# Patient Record
Sex: MALE | Race: BLACK OR AFRICAN AMERICAN | Employment: FULL TIME | URBAN - METROPOLITAN AREA
[De-identification: names, ages, dates, MRNs, and addresses within clinical notes are randomized per-mention and may not be internally consistent; named-entity substitution may affect disease eponyms.]

---

## 2017-05-24 ENCOUNTER — OFFICE VISIT (OUTPATIENT)
Dept: FAMILY MEDICINE CLINIC | Age: 47
End: 2017-05-24

## 2017-05-24 VITALS
HEIGHT: 75 IN | TEMPERATURE: 97.3 F | WEIGHT: 214 LBS | OXYGEN SATURATION: 98 % | DIASTOLIC BLOOD PRESSURE: 110 MMHG | RESPIRATION RATE: 20 BRPM | SYSTOLIC BLOOD PRESSURE: 175 MMHG | BODY MASS INDEX: 26.61 KG/M2 | HEART RATE: 87 BPM

## 2017-05-24 DIAGNOSIS — Z12.5 SCREENING FOR PROSTATE CANCER: ICD-10-CM

## 2017-05-24 DIAGNOSIS — Z23 ENCOUNTER FOR IMMUNIZATION: ICD-10-CM

## 2017-05-24 DIAGNOSIS — E83.52 HYPERCALCEMIA: ICD-10-CM

## 2017-05-24 DIAGNOSIS — E78.00 HYPERCHOLESTEROLEMIA: ICD-10-CM

## 2017-05-24 DIAGNOSIS — I10 ESSENTIAL HYPERTENSION: ICD-10-CM

## 2017-05-24 DIAGNOSIS — E11.8 TYPE 2 DIABETES MELLITUS WITH COMPLICATION, WITHOUT LONG-TERM CURRENT USE OF INSULIN (HCC): Primary | ICD-10-CM

## 2017-05-24 LAB
BILIRUB UR QL STRIP: NEGATIVE
GLUCOSE POC: 332 MG/DL
GLUCOSE UR-MCNC: NORMAL MG/DL
HBA1C MFR BLD HPLC: >15 %
KETONES P FAST UR STRIP-MCNC: NEGATIVE MG/DL
PH UR STRIP: 6 [PH] (ref 4.6–8)
PROT UR QL STRIP: NEGATIVE MG/DL
SP GR UR STRIP: 1.01 (ref 1–1.03)
UA UROBILINOGEN AMB POC: NORMAL (ref 0.2–1)
URINALYSIS CLARITY POC: CLEAR
URINALYSIS COLOR POC: YELLOW
URINE BLOOD POC: NEGATIVE
URINE LEUKOCYTES POC: NEGATIVE
URINE NITRITES POC: NEGATIVE

## 2017-05-24 RX ORDER — GLIPIZIDE 10 MG/1
TABLET ORAL
Qty: 60 TAB | Refills: 5 | Status: SHIPPED | OUTPATIENT
Start: 2017-05-24 | End: 2019-03-13 | Stop reason: SDUPTHER

## 2017-05-24 RX ORDER — PRAVASTATIN SODIUM 40 MG/1
TABLET ORAL
Qty: 30 TAB | Refills: 5 | Status: SHIPPED | OUTPATIENT
Start: 2017-05-24 | End: 2019-03-13 | Stop reason: SDUPTHER

## 2017-05-24 RX ORDER — INSULIN PUMP SYRINGE, 3 ML
EACH MISCELLANEOUS
Qty: 1 KIT | Refills: 0 | Status: SHIPPED | OUTPATIENT
Start: 2017-05-24 | End: 2019-04-16 | Stop reason: SDUPTHER

## 2017-05-24 RX ORDER — LANCETS
EACH MISCELLANEOUS
Qty: 100 EACH | Refills: 11 | Status: SHIPPED | OUTPATIENT
Start: 2017-05-24 | End: 2019-04-16 | Stop reason: SDUPTHER

## 2017-05-24 RX ORDER — HYDROCHLOROTHIAZIDE 25 MG/1
TABLET ORAL
Qty: 30 TAB | Refills: 0 | Status: CANCELLED | OUTPATIENT
Start: 2017-05-24

## 2017-05-24 RX ORDER — GABAPENTIN 300 MG/1
CAPSULE ORAL
Qty: 90 CAP | Refills: 5 | Status: SHIPPED | OUTPATIENT
Start: 2017-05-24 | End: 2019-03-13 | Stop reason: ALTCHOICE

## 2017-05-24 RX ORDER — METFORMIN HYDROCHLORIDE 500 MG/1
2000 TABLET, EXTENDED RELEASE ORAL
Qty: 120 TAB | Refills: 3 | Status: SHIPPED | OUTPATIENT
Start: 2017-05-24 | End: 2018-02-08 | Stop reason: SDUPTHER

## 2017-05-24 RX ORDER — LISINOPRIL 20 MG/1
TABLET ORAL
Qty: 30 TAB | Refills: 5 | Status: SHIPPED | OUTPATIENT
Start: 2017-05-24 | End: 2019-03-13 | Stop reason: SDUPTHER

## 2017-05-24 NOTE — PATIENT INSTRUCTIONS
Vaccine Information Statement     Tdap (Tetanus, Diphtheria, Pertussis) Vaccine: What You Need to Know    Many Vaccine Information Statements are available in Faroese and other languages. See www.immunize.org/vis. Hojas de Información Sobre Vacunas están disponibles en español y en muchos otros idiomas. Visite [email or web address]    1. Why get vaccinated? Tetanus, diphtheria, and pertussis are very serious diseases. Tdap vaccine can protect us from these diseases. And, Tdap vaccine given to pregnant women can protect  babies against pertussis. TETANUS (Lockjaw) is rare in the [facility] today. It causes painful muscle tightening and stiffness, usually all over the body.  It can lead to tightening of muscles in the head and neck so you cant open your mouth, swallow, or sometimes even breathe. Tetanus kills about 1 out of 10 people who are infected even after receiving the best medical care. DIPHTHERIA is also rare in the [facility] today. It can cause a thick coating to form in the back of the throat.  It can lead to breathing problems, heart failure, paralysis, and death. PERTUSSIS (Whooping Cough) causes severe coughing spells, which can cause difficulty breathing, vomiting, and disturbed sleep.  It can also lead to weight loss, incontinence, and rib fractures. Up to 2 in 100 adolescents and 5 in 100 adults with pertussis are hospitalized or have complications, which could include pneumonia or death. These diseases are caused by bacteria. Diphtheria and pertussis are spread from person to person through secretions from coughing or sneezing. Tetanus enters the body through cuts, scratches, or wounds. Before vaccines, as many as 200,000 cases of diphtheria, 200,000 cases of pertussis, and hundreds of cases of tetanus, were reported in the [place] each year.  Since vaccination began, reports of cases for tetanus and diphtheria have dropped by about 99% and for pertussis by about 80%. 2. Tdap vaccine    Tdap vaccine can protect adolescents and adults from tetanus, diphtheria, and pertussis. One dose of Tdap is routinely given at age 6 or 15. People who did not get Tdap at that age should get it as soon as possible. Tdap is especially important for health care professionals and anyone having close contact with a baby younger than 12 months. Pregnant women should get a dose of Tdap during every pregnancy, to protect the  from pertussis. Infants are most at risk for severe, life-threatening complications from pertussis. Another vaccine, called Td, protects against tetanus and diphtheria, but not pertussis. A Td booster should be given every 10 years. Tdap may be given as one of these boosters if you have never gotten Tdap before. Tdap may also be given after a severe cut or burn to prevent tetanus infection. Your doctor or the person giving you the vaccine can give you more information. Tdap may safely be given at the same time as other vaccines. 3. Some people should not get this vaccine     A person who has ever had a life-threatening allergic reaction after a previous dose of any diphtheria, tetanus or pertussis containing vaccine, OR has a severe allergy to any part of this vaccine, should not get Tdap vaccine. Tell the person giving the vaccine about any severe allergies.  Anyone who had coma or long repeated seizures within 7 days after a childhood dose of DTP or DTaP, or a previous dose of Tdap, should not get Tdap, unless a cause other than the vaccine was found. They can still get Td.  Talk to your doctor if you:  - have seizures or another nervous system problem,  - had severe pain or swelling after any vaccine containing diphtheria, tetanus or pertussis,   - ever had a condition called Guillain Barré Syndrome (GBS),  - arent feeling well on the day the shot is scheduled.     4. Risks    With any medicine, including vaccines, there is a chance of side effects. These are usually mild and go away on their own. Serious reactions are also possible but are rare. Most people who get Tdap vaccine do not have any problems with it. Mild Problems following Tdap  (Did not interfere with activities)   Pain where the shot was given (about 3 in 4 adolescents or 2 in 3 adults)   Redness or swelling where the shot was given (about 1 person in 5)   Mild fever of at least 100.4°F (up to about 1 in 25 adolescents or 1 in 100 adults)   Headache (about 3 or 4 people in 10)   Tiredness (about 1 person in 3 or 4)   Nausea, vomiting, diarrhea, stomach ache (up to 1 in 4 adolescents or 1 in 10 adults)   Chills,  sore joints (about 1 person in 10)   Body aches (about 1 person in 3 or 4)    Rash, swollen glands (uncommon)    Moderate Problems following Tdap  (Interfered with activities, but did not require medical attention)   Pain where the shot was given (up to 1 in 5 or 6)    Redness or swelling where the shot was given (up to about 1 in 16 adolescents or 1 in 12 adults)   Fever over 102°F (about 1 in 100 adolescents or 1 in 250 adults)   Headache (about 1 in 7 adolescents or 1 in 10 adults)   Nausea, vomiting, diarrhea, stomach ache (up to 1 or 3 people in 100)   Swelling of the entire arm where the shot was given (up to about 1 in 500). Severe Problems following Tdap  (Unable to perform usual activities; required medical attention)   Swelling, severe pain, bleeding, and redness in the arm where the shot was given (rare). Problems that could happen after any vaccine:     People sometimes faint after a medical procedure, including vaccination. Sitting or lying down for about 15 minutes can help prevent fainting, and injuries caused by a fall. Tell your doctor if you feel dizzy, or have vision changes or ringing in the ears.      Some people get severe pain in the shoulder and have difficulty moving the arm where a shot was given. This happens very rarely.  Any medication can cause a severe allergic reaction. Such reactions from a vaccine are very rare, estimated at fewer than 1 in a million doses, and would happen within a few minutes to a few hours after the vaccination. As with any medicine, there is a very remote chance of a vaccine causing a serious injury or death. The safety of vaccines is always being monitored. For more information, visit: www.cdc.gov/vaccinesafety/    5. What if there is a serious problem? What should I look for?  Look for anything that concerns you, such as signs of a severe allergic reaction, very high fever, or unusual behavior.  Signs of a severe allergic reaction can include hives, swelling of the face and throat, difficulty breathing, a fast heartbeat, dizziness, and weakness. These would usually start a few minutes to a few hours after the vaccination. What should I do?  If you think it is a severe allergic reaction or other emergency that cant wait, call 9-1-1 or get the person to the nearest hospital. Otherwise, call your doctor.  Afterward, the reaction should be reported to the Vaccine Adverse Event Reporting System (VAERS). Your doctor might file this report, or you can do it yourself through the VAERS web site at www.vaers. [facility].gov, or by calling 8-788.249.8279. VAERS does not give medical advice. 6. The National Vaccine Injury Compensation Program    The [facility] Vaccine Injury Compensation Program (VICP) is a federal program that was created to compensate people who may have been injured by certain vaccines. Persons who believe they may have been injured by a vaccine can learn about the program and about filing a claim by calling 9-320.127.2409 or visiting the [facility] website at www.[facility].gov/vaccinecompensation. There is a time limit to file a claim for compensation. 7. How can I learn more?  Ask your doctor.  He or she can give you the vaccine package insert or suggest other sources of information.  Call your local or state health department.  Contact the Centers for Disease Control and Prevention (CDC):  - Call 3-115.449.9740 (1-800-CDC-INFO) or  - Visit CDCs website at www.cdc.gov/vaccines      Vaccine Information Statement   Tdap Vaccine  (2/24/2015)  [address] [id number]    Department of Health and Human Services  Centers for Disease Control and Prevention    Office Use Only

## 2017-05-24 NOTE — PROGRESS NOTES
HISTORY OF PRESENT ILLNESS  Ramesh Fu is a 48 y.o. male. HPI  Patient comes in today to establish care. Hx of DM for 8-9 years. States not compliant with medications and diet. Admits lack ofhealth coverage has prevented him at times from being able to take medications. States he has not monitored his blood sugars in a while. Complains of nocturia every 1-2 hours. Polyuria. Numbness, tingling and pain in feet. Taking neurotin. Weighed 265lb in past.  Last time weighed was ~3 months ago and was 225-230lbs, now 10-15 lbs lighter. Changed from drinking regular sodas to diet sodas. Does eat sweets, not daily, but likes honey buns and Corrine cakes. Hx HTN, noncompliant with a medication regimen and diet. Patient is a current smoker, ~1 ppd. No Known Allergies    Past Medical History:   Diagnosis Date    DM (diabetes mellitus) ([facility] [address] 6/1/2010    Hypertension        Past Surgical History:   Procedure Laterality Date    HX ORTHOPAEDIC      right arm surgery as a child       Social History     Social History    Marital status: SINGLE     Spouse name: N/A    Number of children: N/A    Years of education: N/A     Occupational History    Not on file.      Social History Main Topics    Smoking status: Current Every Day Smoker     Packs/day: 1.00    Smokeless tobacco: Not on file    Alcohol use No    Drug use: No    Sexual activity: Not on file     Other Topics Concern    Not on file     Social History Narrative       Family History   Problem Relation Age of Onset    Diabetes Mother     Hypertension Mother     Cancer Father     Diabetes Sister     Hypertension Sister        Current Outpatient Prescriptions   Medication Sig    pravastatin (PRAVACHOL) 40 mg tablet TAKE 1 TABLET BY MOUTH NIGHTLY    gabapentin (NEURONTIN) 300 mg capsule TAKE ONE CAPSULE BY MOUTH THREE TIMES DAILY    lisinopril (PRINIVIL, ZESTRIL) 20 mg tablet TAKE ONE TABLET BY MOUTH ONCE DAILY    TRADJENTA 5 mg tablet TAKE ONE TABLET BY MOUTH ONCE DAILY    hydroCHLOROthiazide (HYDRODIURIL) 25 mg tablet TAKE ONE TABLET BY MOUTH ONCE DAILY    glipiZIDE (GLUCOTROL) 10 mg tablet TAKE ONE TABLET BY MOUTH ONCE DAILY    metFORMIN ER (GLUCOPHAGE XR) 500 mg tablet Take 4 Tabs by mouth daily (with dinner).  clotrimazole (LOTRIMIN) 1 % topical cream Apply  to affected area two (2) times a day. No current facility-administered medications for this visit. Review of Systems   Constitutional: Negative for chills, diaphoresis, fever, malaise/fatigue and weight loss. HENT: Negative for congestion, ear pain, sore throat and tinnitus. Eyes: Negative for blurred vision and double vision. Respiratory: Negative for cough, sputum production, shortness of breath and wheezing. Cardiovascular: Negative for chest pain, palpitations and leg swelling. Gastrointestinal: Negative for abdominal pain, blood in stool, constipation, diarrhea, nausea and vomiting. Genitourinary: Positive for frequency. Negative for dysuria, flank pain, hematuria and urgency. Decreased libido   Musculoskeletal: Negative for back pain, joint pain and myalgias. Skin: Negative. Neurological: Negative for dizziness, tingling, sensory change, speech change, focal weakness and headaches. Endo/Heme/Allergies: Positive for polydipsia. Psychiatric/Behavioral: Negative for depression. The patient is not nervous/anxious and does not have insomnia. Vitals:    05/24/17 1419   BP: (!) 175/110   Pulse: 87   Resp: 20   Temp: 97.3 °F (36.3 °C)   TempSrc: Oral   SpO2: 98%   Weight: 214 lb (97.1 kg)   Height: 6' 3\" (1.905 m)     Physical Exam   Constitutional: He is oriented to person, place, and time. He appears well-developed and well-nourished. He is cooperative.    BP elevated   HENT:   Right Ear: Hearing, tympanic membrane, external ear and ear canal normal.   Left Ear: Hearing, tympanic membrane, external ear and ear canal normal.   Nose: Nose normal. Mouth/Throat: Uvula is midline, oropharynx is clear and moist and mucous membranes are normal.   Neck: Carotid bruit is not present. No thyroid mass and no thyromegaly present. Cardiovascular: Normal rate, regular rhythm and normal heart sounds. Pulmonary/Chest: Effort normal and breath sounds normal.   Abdominal: Soft. Normal appearance and bowel sounds are normal. There is no hepatosplenomegaly. There is no tenderness. There is no CVA tenderness. Lymphadenopathy:        Head (right side): No submental, no submandibular and no tonsillar adenopathy present. Head (left side): No submental, no submandibular and no tonsillar adenopathy present. He has no cervical adenopathy. Neurological: He is alert and oriented to person, place, and time. Skin: Skin is warm, dry and intact. Psychiatric: He has a normal mood and affect. His behavior is normal. Judgment and thought content normal.     ASSESSMENT and PLAN    ICD-10-CM ICD-9-CM    1. Type 2 diabetes mellitus with complication, without long-term current use of insulin ([facility]) E11.8 250.90  DIABETES FOOT EXAM      gabapentin (NEURONTIN) 300 mg capsule      linagliptin (TRADJENTA) 5 mg tablet      glipiZIDE (GLUCOTROL) 10 mg tablet      metFORMIN ER (GLUCOPHAGE XR) 500 mg tablet      AMB POC HEMOGLOBIN A1C      AMB POC GLUCOSE, QUANTITATIVE, BLOOD      METABOLIC PANEL, COMPREHENSIVE      TSH RFX ON ABNORMAL TO FREE T4      LIPID PANEL      CBC WITH AUTOMATED DIFF      VITAMIN D, 25 HYDROXY      AMB POC URINALYSIS DIP STICK AUTO W/ MICRO      MICROALBUMIN, UR, RAND W/ MICROALBUMIN/CREA RATIO      HEMOGLOBIN A1C WITH EAG      REFERRAL TO PODIATRY      Blood-Glucose Meter monitoring kit      Lancets misc      glucose blood VI test strips (BLOOD GLUCOSE TEST) strip   2. Essential hypertension I10 401.9 lisinopril (PRINIVIL, ZESTRIL) 20 mg tablet      METABOLIC PANEL, COMPREHENSIVE      CBC WITH AUTOMATED DIFF   3.  Hypercholesterolemia E78.00 272.0 pravastatin (PRAVACHOL) 40 mg tablet      LIPID PANEL   4. Hypercalcemia E83.52 275.42 PTH INTACT      TSH RFX ON ABNORMAL TO FREE T4      VITAMIN D, 25 HYDROXY      PSA W/ REFLX FREE PSA   5. Screening for prostate cancer Z12.5 V76.44 PSA W/ REFLX FREE PSA   6. Encounter for immunization Z23 V03.89 TETANUS, DIPHTHERIA TOXOIDS AND ACELLULAR PERTUSSIS VACCINE (TDAP), IN INDIVIDS. >=7, IM     Encounter Diagnoses   Name Primary?  Type 2 diabetes mellitus with complication, without long-term current use of insulin (HCC) Yes    Essential hypertension     Hypercholesterolemia     Hypercalcemia     Screening for prostate cancer     Encounter for immunization      Orders Placed This Encounter    Tetanus, diphtheria toxoids and acellular pertussis (TDAP) vaccine, in individuals >=7 years, IM    METABOLIC PANEL, COMPREHENSIVE    PTH INTACT    TSH RFX ON ABNORMAL TO FREE T4    LIPID PANEL    CBC WITH AUTOMATED DIFF    VITAMIN D, 25 HYDROXY    PSA W/ REFLX FREE PSA    MICROALBUMIN, UR, RAND W/ MICROALBUMIN/CREA RATIO    HEMOGLOBIN A1C WITH EAG    REFERRAL TO PODIATRY    AMB POC HEMOGLOBIN A1C    AMB POC GLUCOSE, QUANTITATIVE, BLOOD    AMB POC URINALYSIS DIP STICK AUTO W/ MICRO    pravastatin (PRAVACHOL) 40 mg tablet    gabapentin (NEURONTIN) 300 mg capsule    lisinopril (PRINIVIL, ZESTRIL) 20 mg tablet    linagliptin (TRADJENTA) 5 mg tablet    glipiZIDE (GLUCOTROL) 10 mg tablet    metFORMIN ER (GLUCOPHAGE XR) 500 mg tablet    Blood-Glucose Meter monitoring kit    Lancets misc    glucose blood VI test strips (BLOOD GLUCOSE TEST) strip     Fidelia De Diosauvais was seen today for establish care, blood pressure check, diabetes and cholesterol problem. Diagnoses and all orders for this visit:    Type 2 diabetes mellitus with complication, without long-term current use of insulin (HCC) - UNCONTROLLED.  a1C >15%!! RESTART metformin, glipizide, and tradjenta.    Encouraged patient to aim for 45g carbohydrate per meal, eliminate the juice and all sweet drinks. Patient to monitor BG twice daily, fasting daily and varying times before lunch or dinner, and bedtime. .  Bring readings at next office visit in 2 weeks. Discussed long term effects of uncontrolled diabetes, including but not limited to stroke, heart attack, kidney failure, blindness, amputation, ED   -      DIABETES FOOT EXAM  -     gabapentin (NEURONTIN) 300 mg capsule; TAKE ONE CAPSULE BY MOUTH THREE TIMES DAILY  -     linagliptin (TRADJENTA) 5 mg tablet; TAKE ONE TABLET BY MOUTH ONCE DAILY  -     glipiZIDE (GLUCOTROL) 10 mg tablet; TAKE ONE TABLET BY MOUTH TWICE DAILY WITH MEALS  -     metFORMIN ER (GLUCOPHAGE XR) 500 mg tablet; Take 4 Tabs by mouth daily (with dinner). -     AMB POC HEMOGLOBIN A1C  -     AMB POC GLUCOSE, QUANTITATIVE, BLOOD  -     METABOLIC PANEL, COMPREHENSIVE  -     TSH RFX ON ABNORMAL TO FREE T4  -     LIPID PANEL  -     CBC WITH AUTOMATED DIFF  -     VITAMIN D, 25 HYDROXY  -     AMB POC URINALYSIS DIP STICK AUTO W/ MICRO  -     MICROALBUMIN, UR, RAND W/ MICROALBUMIN/CREA RATIO  -     HEMOGLOBIN A1C WITH EAG  -     REFERRAL TO PODIATRY  -     Blood-Glucose Meter monitoring kit; MONITOR BG TWICE DAILY  DX E11.8  TYPE 2 DM  -     Lancets misc; MONITOR BG TWICE DAILY  DX E11.8  TYPE 2 DM  -     glucose blood VI test strips (BLOOD GLUCOSE TEST) strip; MONITOR BG TWICE DAILY  DX E11.8  TYPE 2 DM    Essential hypertension - UNCONTROLLED. Restart lisinopril, Provided and reviewed written patient education on low sodium diet, DASH diet.   Encouraged regular physical activity (such as walking for 30 minutes daily for 5 days per week)  -     lisinopril (PRINIVIL, ZESTRIL) 20 mg tablet; TAKE ONE TABLET BY MOUTH ONCE DAILY  -     METABOLIC PANEL, COMPREHENSIVE  -     CBC WITH AUTOMATED DIFF    Hypercholesterolemia  -     pravastatin (PRAVACHOL) 40 mg tablet; TAKE 1 TABLET BY MOUTH NIGHTLY  -     LIPID PANEL    Hypercalcemia  -     PTH INTACT  - TSH RFX ON ABNORMAL TO FREE T4  -     VITAMIN D, 25 HYDROXY  -     PSA W/ REFLX FREE PSA    Screening for prostate cancer  -     PSA W/ REFLX FREE PSA    Encounter for immunization  -     Tetanus, diphtheria toxoids and acellular pertussis (TDAP) vaccine, in individuals >=7 years, IM    Other orders  -     Cancel: hydroCHLOROthiazide (HYDRODIURIL) 25 mg tablet;  - will not restart due to elevated calcium levels. Follow-up Disposition:  Return in about 2 weeks (around 6/7/2017). lab results and schedule of future lab studies reviewed with patient  reviewed diet, exercise and weight control  cardiovascular risk and specific lipid/LDL goals reviewed    I have reviewed the patient's allergies and made any necessary changes. Medical, procedural, social and family histories have been reviewed and updated as medically indicated. I have reconciled and/or revised patient medications in the EMR. I have discussed each diagnosis listed in this note with Stephanie Brothers and/or their family. I have discussed treatment options and the risk/benefit analysis of those options, including safe use of medications and possible medication side effects. Through the use of shared decision making we have agreed to the above plan. The patient has received an after-visit summary and questions were answered concerning future plans. Joslyn Burgess, YOSSI-C    This note will not be viewable in "[employer]hart. Total visit time spent in direct patient care/contact:  >60 minutes  Greater than 50% of visit time was spent counseling and coordinating care.

## 2017-05-24 NOTE — MR AVS SNAPSHOT
Visit Information Date & Time Provider Department Dept. Phone Encounter #  
 5/24/2017  2:00 PM Leland Mckoy [phone] [id number] Follow-up Instructions Return in about 2 weeks (around 6/7/2017). Upcoming Health Maintenance Date Due  
 EYE EXAM RETINAL OR DILATED Q1 7/10/1978 Pneumococcal 19-64 Medium Risk (1 of 1 - PPSV23) 7/10/1987 DTaP/Tdap/Td series (1 - Tdap) 7/10/1989 HEMOGLOBIN A1C Q6M 4/12/2016 FOOT EXAM Q1 10/12/2016 MICROALBUMIN Q1 10/12/2016 LIPID PANEL Q1 10/12/2016 INFLUENZA AGE 9 TO ADULT 8/1/2017 Allergies as of 5/24/2017  Review Complete On: 5/24/2017 By: Bel Bose LPN No Known Allergies Current Immunizations  Reviewed on 10/12/2015 Name Date Tdap  Incomplete Not reviewed this visit You Were Diagnosed With   
  
 Codes Comments Diabetes mellitus due to underlying condition with hyperosmolarity without coma, without long-term current use of insulin (HCC)    -  Primary ICD-10-CM: E08.00 ICD-9-CM: 249.20 Encounter for immunization     ICD-10-CM: D47 ICD-9-CM: V03.89 Hypercholesterolemia     ICD-10-CM: E78.00 ICD-9-CM: 272.0 Herpes zoster with meningitis     ICD-10-CM: B02.1 ICD-9-CM: 053.0 Essential hypertension     ICD-10-CM: I10 
ICD-9-CM: 401.9 Type 2 diabetes mellitus with complication, without long-term current use of insulin (HCC)     ICD-10-CM: E11.8 ICD-9-CM: 250.90 Hypercalcemia     ICD-10-CM: S61.10 
ICD-9-CM: 275.42 Screening for prostate cancer     ICD-10-CM: Z12.5 ICD-9-CM: V76.44 Vitals BP Pulse Temp Resp Height(growth percentile) Weight(growth percentile) (!) 175/110 87 97.3 °F (36.3 °C) (Oral) 20 6' 3\" (1.905 m) 214 lb (97.1 kg) SpO2 BMI 98% 26.75 kg/m2 Vitals History BMI and BSA Data Body Mass Index Body Surface Area  
 26.75 kg/m 2 2.27 m 2 Preferred Pharmacy Pharmacy Name Phone [facility] [address] [phone] Your Updated Medication List  
  
   
This list is accurate as of: 5/24/17  4:33 PM.  Always use your most recent med list.  
  
  
  
  
 Blood-Glucose Meter monitoring kit MONITOR BG TWICE DAILY  DX E11.8  TYPE 2 DM  
  
 clotrimazole 1 % topical cream  
Commonly known as:  Cristino Zamora Apply  to affected area two (2) times a day.  
  
 gabapentin 300 mg capsule Commonly known as:  NEURONTIN  
TAKE ONE CAPSULE BY MOUTH THREE TIMES DAILY  
  
 glipiZIDE 10 mg tablet Commonly known as:  GLUCOTROL  
TAKE ONE TABLET BY MOUTH TWICE DAILY WITH MEALS  
  
 glucose blood VI test strips strip Commonly known as:  blood glucose test  
MONITOR BG TWICE DAILY  DX E11.8  TYPE 2 DM  
  
 hydroCHLOROthiazide 25 mg tablet Commonly known as:  HYDRODIURIL  
TAKE ONE TABLET BY MOUTH ONCE DAILY Lancets Misc MONITOR BG TWICE DAILY  DX E11.8  TYPE 2 DM  
  
 linagliptin 5 mg tablet Commonly known as:  Vikki Tryon TAKE ONE TABLET BY MOUTH ONCE DAILY  
  
 lisinopril 20 mg tablet Commonly known as:  PRINIVIL, ZESTRIL  
TAKE ONE TABLET BY MOUTH ONCE DAILY  
  
 metFORMIN  mg tablet Commonly known as:  GLUCOPHAGE XR Take 4 Tabs by mouth daily (with dinner). pravastatin 40 mg tablet Commonly known as:  PRAVACHOL  
TAKE 1 TABLET BY MOUTH NIGHTLY Prescriptions Printed Refills Blood-Glucose Meter monitoring kit 0 Sig: MONITOR BG TWICE DAILY  DX E11.8  TYPE 2 DM Class: Print Lancets misc 11 Sig: MONITOR BG TWICE DAILY  DX E11.8  TYPE 2 DM Class: Print  
 glucose blood VI test strips (BLOOD GLUCOSE TEST) strip 11 Sig: MONITOR BG TWICE DAILY  DX E11.8  TYPE 2 DM Class: Print Prescriptions Sent to Pharmacy Refills  
 pravastatin (PRAVACHOL) 40 mg tablet 5 Sig: TAKE 1 TABLET BY MOUTH NIGHTLY  Class: Normal  
 Pharmacy: [address] [place] [address], [address] Ph #: [phone]  
 gabapentin (NEURONTIN) 300 mg capsule 5 Sig: TAKE ONE CAPSULE BY MOUTH THREE TIMES DAILY Class: Normal  
 Pharmacy: [facility] [address], [address] Ph #: [phone]  
 lisinopril (PRINIVIL, ZESTRIL) 20 mg tablet 5 Sig: TAKE ONE TABLET BY MOUTH ONCE DAILY Class: Normal  
 Pharmacy: [facility], [address] Ph #: [phone]  
 linagliptin (TRADJENTA) 5 mg tablet 5 Sig: TAKE ONE TABLET BY MOUTH ONCE DAILY Class: Normal  
 Pharmacy: [facility], [address] Ph #: [phone]  
 glipiZIDE (GLUCOTROL) 10 mg tablet 5 Sig: TAKE ONE TABLET BY MOUTH TWICE DAILY WITH MEALS Class: Normal  
 Pharmacy: [facility], [address] Ph #: [phone]  
 metFORMIN ER (GLUCOPHAGE XR) 500 mg tablet 3 Sig: Take 4 Tabs by mouth daily (with dinner). Class: Normal  
 Pharmacy: [facility], [address] Ph #: [phone] Route: Oral  
  
We Performed the Following AMB POC GLUCOSE, QUANTITATIVE, BLOOD [98178 CPT(R)] AMB POC HEMOGLOBIN A1C [91977 CPT(R)] AMB POC URINALYSIS DIP STICK AUTO W/ MICRO [03990 CPT(R)] CBC WITH AUTOMATED DIFF [97582 CPT(R)] HEMOGLOBIN A1C WITH EAG [45275 CPT(R)]  DIABETES FOOT EXAM [7 Custom] LIPID PANEL [57806 CPT(R)] METABOLIC PANEL, COMPREHENSIVE [22009 CPT(R)] MICROALBUMIN, UR, RAND W/ MICROALBUMIN/CREA RATIO [id number] CPT(R)] PSA W/ REFLX FREE PSA [58833 CPT(R)] PTH INTACT [07161 CPT(R)] REFERRAL TO PODIATRY [REF90 Custom] Comments:  
 Please evaluate patient for ONYCHOMYCOSIS, DIABETIC FOOT. TETANUS, DIPHTHERIA TOXOIDS AND ACELLULAR PERTUSSIS VACCINE (TDAP), IN INDIVIDS. >=7, IM [id number] CPT(R)] TSH RFX ON ABNORMAL TO FREE T4 [[id number] Custom] VITAMIN D, 25 HYDROXY [id number] CPT(R)] Follow-up Instructions Return in about 2 weeks (around 2017). Referral Information Referral ID Referred By Referred To  
  
 [id number] Thomas MONZON Not Available Visits Status Start Date End Date 1 New Request 17 If your referral has a status of pending review or denied, additional information will be sent to support the outcome of this decision. Patient Instructions Vaccine Information Statement Tdap (Tetanus, Diphtheria, Pertussis) Vaccine: What You Need to Know Many Vaccine Information Statements are available in Maltese and other languages. See www.immunize.org/vis. Hojas de Información Sobre Vacunas están disponibles en español y en muchos otros idiomas. Visite Worth[employer].si 1. Why get vaccinated? Tetanus, diphtheria, and pertussis are very serious diseases. Tdap vaccine can protect us from these diseases. And, Tdap vaccine given to pregnant women can protect  babies against pertussis. TETANUS (Lockjaw) is rare in the [facility] today. It causes painful muscle tightening and stiffness, usually all over the body. ? It can lead to tightening of muscles in the head and neck so you cant open your mouth, swallow, or sometimes even breathe. Tetanus kills about 1 out of 10 people who are infected even after receiving the best medical care. DIPHTHERIA is also rare in the [facility] today. It can cause a thick coating to form in the back of the throat. ? It can lead to breathing problems, heart failure, paralysis, and death. PERTUSSIS (Whooping Cough) causes severe coughing spells, which can cause difficulty breathing, vomiting, and disturbed sleep. ? It can also lead to weight loss, incontinence, and rib fractures.  Up to 2 in 100 adolescents and 5 in 100 adults with pertussis are hospitalized or have complications, which could include pneumonia or death. These diseases are caused by bacteria. Diphtheria and pertussis are spread from person to person through secretions from coughing or sneezing. Tetanus enters the body through cuts, scratches, or wounds. Before vaccines, as many as 200,000 cases of diphtheria, 200,000 cases of pertussis, and hundreds of cases of tetanus, were reported in the [place] each year. Since vaccination began, reports of cases for tetanus and diphtheria have dropped by about 99% and for pertussis by about 80%. 2. Tdap vaccine Tdap vaccine can protect adolescents and adults from tetanus, diphtheria, and pertussis. One dose of Tdap is routinely given at age 6 or 15. People who did not get Tdap at that age should get it as soon as possible. Tdap is especially important for health care professionals and anyone having close contact with a baby younger than 12 months. Pregnant women should get a dose of Tdap during every pregnancy, to protect the  from pertussis. Infants are most at risk for severe, life-threatening complications from pertussis. Another vaccine, called Td, protects against tetanus and diphtheria, but not pertussis. A Td booster should be given every 10 years. Tdap may be given as one of these boosters if you have never gotten Tdap before. Tdap may also be given after a severe cut or burn to prevent tetanus infection. Your doctor or the person giving you the vaccine can give you more information. Tdap may safely be given at the same time as other vaccines. 3. Some people should not get this vaccine  A person who has ever had a life-threatening allergic reaction after a previous dose of any diphtheria, tetanus or pertussis containing vaccine, OR has a severe allergy to any part of this vaccine, should not get Tdap vaccine. Tell the person giving the vaccine about any severe allergies.  Anyone who had coma or long repeated seizures within 7 days after a childhood dose of DTP or DTaP, or a previous dose of Tdap, should not get Tdap, unless a cause other than the vaccine was found. They can still get Td.  Talk to your doctor if you: 
- have seizures or another nervous system problem, 
- had severe pain or swelling after any vaccine containing diphtheria, tetanus or pertussis,  
- ever had a condition called Guillain Barré Syndrome (GBS), 
- arent feeling well on the day the shot is scheduled. 4. Risks With any medicine, including vaccines, there is a chance of side effects. These are usually mild and go away on their own. Serious reactions are also possible but are rare. Most people who get Tdap vaccine do not have any problems with it. Mild Problems following Tdap 
(Did not interfere with activities)  Pain where the shot was given (about 3 in 4 adolescents or 2 in 3 adults)  Redness or swelling where the shot was given (about 1 person in 5)  Mild fever of at least 100.4°F (up to about 1 in 25 adolescents or 1 in 100 adults)  Headache (about 3 or 4 people in 10)  Tiredness (about 1 person in 3 or 4)  Nausea, vomiting, diarrhea, stomach ache (up to 1 in 4 adolescents or 1 in 10 adults)  Chills,  sore joints (about 1 person in 10)  Body aches (about 1 person in 3 or 4)  Rash, swollen glands (uncommon) Moderate Problems following Tdap (Interfered with activities, but did not require medical attention)  Pain where the shot was given (up to 1 in 5 or 6)  Redness or swelling where the shot was given (up to about 1 in 16 adolescents or 1 in 12 adults)  Fever over 102°F (about 1 in 100 adolescents or 1 in 250 adults)  Headache (about 1 in 7 adolescents or 1 in 10 adults)  Nausea, vomiting, diarrhea, stomach ache (up to 1 or 3 people in 100)  Swelling of the entire arm where the shot was given (up to about 1 in 500). Severe Problems following Tdap (Unable to perform usual activities; required medical attention)  Swelling, severe pain, bleeding, and redness in the arm where the shot was given (rare). Problems that could happen after any vaccine:  People sometimes faint after a medical procedure, including vaccination. Sitting or lying down for about 15 minutes can help prevent fainting, and injuries caused by a fall. Tell your doctor if you feel dizzy, or have vision changes or ringing in the ears.  Some people get severe pain in the shoulder and have difficulty moving the arm where a shot was given. This happens very rarely.  Any medication can cause a severe allergic reaction. Such reactions from a vaccine are very rare, estimated at fewer than 1 in a million doses, and would happen within a few minutes to a few hours after the vaccination. As with any medicine, there is a very remote chance of a vaccine causing a serious injury or death. The safety of vaccines is always being monitored. For more information, visit: www.cdc.gov/vaccinesafety/ 
 
 
The [facility] Vaccine Injury Compensation Program (VICP) is a federal program that was created to compensate people who may have been injured by certain vaccines. Persons who believe they may have been injured by a vaccine can learn about the program and about filing a claim by calling 5-257.301.6662 or visiting the [employer] [address] [employer] website at www.[facility].gov/vaccinecompensation. There is a time limit to file a claim for compensation. 7. How can I learn more?  Ask your doctor. He or she can give you the vaccine package insert or suggest other sources of information.  Call your local or state health department.  Contact the Centers for Disease Control and Prevention (CDC): 
- Call 6-974.267.8280 (0-700-OLH-INFO) or 
- Visit CDCs website at www.cdc.gov/vaccines Vaccine Information Statement Tdap Vaccine 
(2/24/2015) 42 UGustavo Nascimento Child 986EQ-55 Department of [facility] and [employer] Centers for Disease Control and Prevention Office Use Only Introducing \[facility]\"" & HEALTH SERVICES! Dear Kp Downey: Thank you for requesting a [employer] account. Our records indicate that you already have an active Tri[employer] account. You can access your account anytime at [email or web address] Did you know that you can access your hospital and ER discharge instructions at any time in [employer]? You can also review all of your test results from your hospital stay or ER visit. Additional Information If you have questions, please visit the Frequently Asked Questions section of the [employer] website at [email or web address] Remember, Tri[employer]eat is NOT to be used for urgent needs. For medical emergencies, dial 911. Now available from your iPhone and Android! Please provide this summary of care documentation to your next provider. Your primary care clinician is listed as Via Rony Pierce.  If you have any questions after today's visit, please call [phone].

## 2017-05-25 LAB
25(OH)D3+25(OH)D2 SERPL-MCNC: 18.6 NG/ML (ref 30–100)
ALBUMIN SERPL-MCNC: 4.6 G/DL (ref 3.5–5.5)
ALBUMIN/CREAT UR: 20.1 MG/G CREAT (ref 0–30)
ALBUMIN/GLOB SERPL: 1.8 {RATIO} (ref 1.2–2.2)
ALP SERPL-CCNC: 108 IU/L (ref 39–117)
ALT SERPL-CCNC: 13 IU/L (ref 0–44)
AST SERPL-CCNC: 11 IU/L (ref 0–40)
BASOPHILS # BLD AUTO: 0 X10E3/UL (ref 0–0.2)
BASOPHILS NFR BLD AUTO: 0 %
BILIRUB SERPL-MCNC: <0.2 MG/DL (ref 0–1.2)
BUN SERPL-MCNC: 12 MG/DL (ref 6–24)
BUN/CREAT SERPL: 12 (ref 9–20)
CALCIUM SERPL-MCNC: 12.1 MG/DL (ref 8.7–10.2)
CHLORIDE SERPL-SCNC: 96 MMOL/L (ref 96–106)
CHOLEST SERPL-MCNC: 283 MG/DL (ref 100–199)
CO2 SERPL-SCNC: 27 MMOL/L (ref 18–29)
COMMENT, 011824: ABNORMAL
CREAT SERPL-MCNC: 1 MG/DL (ref 0.76–1.27)
CREAT UR-MCNC: 38.9 MG/DL
EOSINOPHIL # BLD AUTO: 0.3 X10E3/UL (ref 0–0.4)
EOSINOPHIL NFR BLD AUTO: 3 %
ERYTHROCYTE [DISTWIDTH] IN BLOOD BY AUTOMATED COUNT: 15.4 % (ref 12.3–15.4)
EST. AVERAGE GLUCOSE BLD GHB EST-MCNC: 369 MG/DL
GLOBULIN SER CALC-MCNC: 2.5 G/DL (ref 1.5–4.5)
GLUCOSE SERPL-MCNC: 325 MG/DL (ref 65–99)
HBA1C MFR BLD: 14.5 % (ref 4.8–5.6)
HCT VFR BLD AUTO: 45.9 % (ref 37.5–51)
HDLC SERPL-MCNC: 39 MG/DL
HGB BLD-MCNC: 15.5 G/DL (ref 12.6–17.7)
IMM GRANULOCYTES # BLD: 0 X10E3/UL (ref 0–0.1)
IMM GRANULOCYTES NFR BLD: 0 %
INTERPRETATION, 910389: NORMAL
LDLC SERPL CALC-MCNC: 201 MG/DL (ref 0–99)
LYMPHOCYTES # BLD AUTO: 3.3 X10E3/UL (ref 0.7–3.1)
LYMPHOCYTES NFR BLD AUTO: 29 %
Lab: NORMAL
Lab: NORMAL
MCH RBC QN AUTO: 27.2 PG (ref 26.6–33)
MCHC RBC AUTO-ENTMCNC: 33.8 G/DL (ref 31.5–35.7)
MCV RBC AUTO: 81 FL (ref 79–97)
MICROALBUMIN UR-MCNC: 7.8 UG/ML
MONOCYTES # BLD AUTO: 0.7 X10E3/UL (ref 0.1–0.9)
MONOCYTES NFR BLD AUTO: 6 %
NEUTROPHILS # BLD AUTO: 7 X10E3/UL (ref 1.4–7)
NEUTROPHILS NFR BLD AUTO: 62 %
PLATELET # BLD AUTO: 333 X10E3/UL (ref 150–379)
POTASSIUM SERPL-SCNC: 4.6 MMOL/L (ref 3.5–5.2)
PROT SERPL-MCNC: 7.1 G/DL (ref 6–8.5)
PSA SERPL-MCNC: 1.1 NG/ML (ref 0–4)
PTH-INTACT SERPL-MCNC: 17 PG/ML (ref 15–65)
RBC # BLD AUTO: 5.7 X10E6/UL (ref 4.14–5.8)
REFLEX CRITERIA: NORMAL
SODIUM SERPL-SCNC: 139 MMOL/L (ref 134–144)
TRIGL SERPL-MCNC: 217 MG/DL (ref 0–149)
TSH SERPL DL<=0.005 MIU/L-ACNC: 0.69 UIU/ML (ref 0.45–4.5)
VLDLC SERPL CALC-MCNC: 43 MG/DL (ref 5–40)
WBC # BLD AUTO: 11.4 X10E3/UL (ref 3.4–10.8)

## 2017-08-07 DIAGNOSIS — E55.9 VITAMIN D DEFICIENCY: Primary | ICD-10-CM

## 2017-08-07 RX ORDER — ERGOCALCIFEROL 1.25 MG/1
50000 CAPSULE ORAL
Qty: 4 CAP | Refills: 2 | Status: SHIPPED | OUTPATIENT
Start: 2017-08-07 | End: 2019-03-13 | Stop reason: ALTCHOICE

## 2019-03-13 ENCOUNTER — OFFICE VISIT (OUTPATIENT)
Dept: FAMILY MEDICINE CLINIC | Age: 49
End: 2019-03-13

## 2019-03-13 VITALS
RESPIRATION RATE: 18 BRPM | DIASTOLIC BLOOD PRESSURE: 112 MMHG | WEIGHT: 201.6 LBS | TEMPERATURE: 97.3 F | OXYGEN SATURATION: 100 % | HEART RATE: 84 BPM | SYSTOLIC BLOOD PRESSURE: 180 MMHG | BODY MASS INDEX: 25.07 KG/M2 | HEIGHT: 75 IN

## 2019-03-13 DIAGNOSIS — E83.52 HYPERCALCEMIA: ICD-10-CM

## 2019-03-13 DIAGNOSIS — E11.8 TYPE 2 DIABETES MELLITUS WITH COMPLICATION, WITHOUT LONG-TERM CURRENT USE OF INSULIN (HCC): Primary | ICD-10-CM

## 2019-03-13 DIAGNOSIS — I10 ESSENTIAL HYPERTENSION: ICD-10-CM

## 2019-03-13 DIAGNOSIS — M86.9 OSTEOMYELITIS OF LEFT FOOT, UNSPECIFIED TYPE (HCC): ICD-10-CM

## 2019-03-13 DIAGNOSIS — Z72.0 TOBACCO ABUSE: ICD-10-CM

## 2019-03-13 DIAGNOSIS — L97.529 DIABETIC ULCER OF TOE OF LEFT FOOT ASSOCIATED WITH TYPE 2 DIABETES MELLITUS, UNSPECIFIED ULCER STAGE (HCC): ICD-10-CM

## 2019-03-13 DIAGNOSIS — E11.621 DIABETIC ULCER OF TOE OF LEFT FOOT ASSOCIATED WITH TYPE 2 DIABETES MELLITUS, UNSPECIFIED ULCER STAGE (HCC): ICD-10-CM

## 2019-03-13 DIAGNOSIS — E78.00 HYPERCHOLESTEROLEMIA: ICD-10-CM

## 2019-03-13 LAB — HBA1C MFR BLD HPLC: 12.4 %

## 2019-03-13 RX ORDER — GLIPIZIDE 10 MG/1
TABLET ORAL
Qty: 60 TAB | Refills: 5 | Status: SHIPPED | OUTPATIENT
Start: 2019-03-13 | End: 2019-09-14 | Stop reason: SDUPTHER

## 2019-03-13 RX ORDER — LISINOPRIL 40 MG/1
TABLET ORAL
Qty: 30 TAB | Refills: 5 | Status: SHIPPED | OUTPATIENT
Start: 2019-03-13 | End: 2019-09-14 | Stop reason: SDUPTHER

## 2019-03-13 RX ORDER — VARENICLINE TARTRATE 25 MG
KIT ORAL
Qty: 1 DOSE PACK | Refills: 0 | Status: SHIPPED | OUTPATIENT
Start: 2019-03-13 | End: 2019-06-13 | Stop reason: ALTCHOICE

## 2019-03-13 RX ORDER — METFORMIN HYDROCHLORIDE 500 MG/1
TABLET, EXTENDED RELEASE ORAL
Qty: 120 TAB | Refills: 5 | Status: SHIPPED | OUTPATIENT
Start: 2019-03-13 | End: 2019-09-14 | Stop reason: SDUPTHER

## 2019-03-13 RX ORDER — GABAPENTIN 300 MG/1
CAPSULE ORAL
Qty: 90 CAP | Refills: 5 | Status: SHIPPED | OUTPATIENT
Start: 2019-03-13 | End: 2019-06-13 | Stop reason: ALTCHOICE

## 2019-03-13 RX ORDER — PRAVASTATIN SODIUM 40 MG/1
TABLET ORAL
Qty: 30 TAB | Refills: 5 | Status: SHIPPED | OUTPATIENT
Start: 2019-03-13 | End: 2019-06-13 | Stop reason: ALTCHOICE

## 2019-03-13 NOTE — PROGRESS NOTES
Chief Complaint   Patient presents with    Diabetes    Hypertension     1. Have you been to the ER, urgent care clinic since your last visit? Hospitalized since your last visit? Yes, Urgent Care 2/24. Gout and left toe infection. 2. Have you seen or consulted any other health care providers outside of the [address] [place] [address] since your last visit? Include any pap smears or colon screening.  No    Health Maintenance Due   Topic Date Due    EYE EXAM RETINAL OR DILATED  07/10/1978    Pneumococcal 19-64 Medium Risk (1 of 1 - PPSV23) 07/10/1987    HEMOGLOBIN A1C Q6M  11/24/2017    FOOT EXAM Q1  05/24/2018    MICROALBUMIN Q1  05/24/2018    LIPID PANEL Q1  05/24/2018    Shingrix Vaccine Age 50> (1 of 2) 07/10/2018    FOBT Q 1 YEAR AGE 50-75  07/10/2018    Influenza Age 9 to Adult  08/01/2018

## 2019-03-13 NOTE — PROGRESS NOTES
HISTORY OF PRESENT ILLNESS  Jose Vizcaino is a 46 y.o. male. HPI  Patient comes int today for follow up diabetes and HTN  Last office visit 17. At that time, his A1c was 14.5%. Patient states his teenage daughter was pregnant, had to deliver child early. States wife was fired from job and he lost insurance. Was without insurance for a while. Has not been on any medication. Had a perianal abscess in 2018, was treated at [facility] overnight stay,  Went to [facility] on 19 for gout and cellulitis left foot. Calderon Sy Has been trying to eat better. Eating more chicken, cut out red meats. Cut out diet [employer]. Has been walking trails, riding bikes. Trying to quit smoking. Smokes 1 ppd.   No Known Allergies    Past Medical History:   Diagnosis Date    [facility] 2010    Hypertension     Vitamin D deficiency 2017       Past Surgical History:   Procedure Laterality Date    HX ORTHOPAEDIC      right arm surgery as a child       Social History     Socioeconomic History    Marital status: SINGLE     Spouse name: Not on file    Number of children: Not on file    Years of education: Not on file    Highest education level: Not on file   Social Needs    Financial resource strain: Not on file    Food insecurity - worry: Not on file    Food insecurity - inability: Not on file   [employer] needs - medical: Not on file   [employer] needs - non-medical: Not on file   Occupational History    Not on file   Tobacco Use    Smoking status: Current Every Day Smoker     Packs/day: 1.00    Smokeless tobacco: Never Used   Substance and Sexual Activity    Alcohol use: Yes     Comment: occ    Drug use: No    Sexual activity: Not on file   Other Topics Concern    Not on file   Social History Narrative    Not on file       Family History   Problem Relation Age of Onset    Diabetes Mother     Hypertension Mother     Cancer Father     Diabetes Sister          at age 35y from MI, stroke, kidney failure    Hypertension Sister     Kidney Disease Sister     Stroke Sister     Heart Attack Sister        Current Outpatient Medications   Medication Sig    metFORMIN ER (GLUCOPHAGE XR) 500 mg tablet TAKE FOUR TABLETS BY MOUTH ONCE DAILY WITH  DINNER (Patient not taking: Reported on 3/13/2019)    ergocalciferol (ERGOCALCIFEROL) 50,000 unit capsule Take 1 Cap by mouth every seven (7) days. (Patient not taking: Reported on 3/13/2019)    pravastatin (PRAVACHOL) 40 mg tablet TAKE 1 TABLET BY MOUTH NIGHTLY (Patient not taking: Reported on 3/13/2019)    gabapentin (NEURONTIN) 300 mg capsule TAKE ONE CAPSULE BY MOUTH THREE TIMES DAILY (Patient not taking: Reported on 3/13/2019)    lisinopril (PRINIVIL, ZESTRIL) 20 mg tablet TAKE ONE TABLET BY MOUTH ONCE DAILY (Patient not taking: Reported on 3/13/2019)    linagliptin (TRADJENTA) 5 mg tablet TAKE ONE TABLET BY MOUTH ONCE DAILY (Patient not taking: Reported on 3/13/2019)    glipiZIDE (GLUCOTROL) 10 mg tablet TAKE ONE TABLET BY MOUTH TWICE DAILY WITH MEALS (Patient not taking: Reported on 3/13/2019)    Blood-Glucose Meter monitoring kit MONITOR BG TWICE DAILY  DX E11.8  TYPE 2 DM (Patient not taking: Reported on 3/13/2019)    Lancets misc MONITOR BG TWICE DAILY  DX E11.8  TYPE 2 DM (Patient not taking: Reported on 3/13/2019)    glucose blood VI test strips (BLOOD GLUCOSE TEST) strip MONITOR BG TWICE DAILY  DX E11.8  TYPE 2 DM (Patient not taking: Reported on 3/13/2019)    clotrimazole (LOTRIMIN) 1 % topical cream Apply  to affected area two (2) times a day. (Patient not taking: Reported on 3/13/2019)     No current facility-administered medications for this visit. Review of Systems   Constitutional: Negative for chills, diaphoresis, fever, malaise/fatigue and weight loss. HENT: Negative for congestion, ear pain, sore throat and tinnitus. Eyes: Negative for blurred vision and double vision.    Respiratory: Negative for cough, sputum production, shortness of breath and wheezing. Cardiovascular: Negative for chest pain, palpitations and leg swelling. Gastrointestinal: Negative for abdominal pain, blood in stool, constipation, diarrhea, nausea and vomiting. Genitourinary: Positive for frequency. Negative for dysuria, flank pain, hematuria and urgency. Musculoskeletal: Negative for back pain, joint pain and myalgias. Skin: Negative. Ulcer under left 4th toe, left 4th toe swelling, dark discoloration   Neurological: Negative for dizziness, tingling, sensory change, speech change, focal weakness and headaches. Endo/Heme/Allergies: Positive for polydipsia. Psychiatric/Behavioral: Negative for depression. The patient is not nervous/anxious and does not have insomnia. Vitals:    03/13/19 0853 03/13/19 0930   BP: (!) 190/114 (!) 180/112   Pulse: 84    Resp: 18    Temp: 97.3 °F (36.3 °C)    TempSrc: Oral    SpO2: 100%    Weight: 201 lb 9.6 oz (91.4 kg)    Height: 6' 3\" (1.905 m)      Physical Exam   Constitutional: He is oriented to person, place, and time. Vital signs are normal. He appears well-developed and well-nourished. He is cooperative. HENT:   Right Ear: Hearing, tympanic membrane, external ear and ear canal normal.   Left Ear: Hearing, tympanic membrane, external ear and ear canal normal.   Nose: Nose normal.   Mouth/Throat: Uvula is midline, oropharynx is clear and moist and mucous membranes are normal.   Cardiovascular: Normal rate, regular rhythm and normal heart sounds. Pulmonary/Chest: Effort normal and breath sounds normal.   Abdominal: Soft. Normal appearance and bowel sounds are normal. There is no hepatosplenomegaly. There is no tenderness. There is no CVA tenderness. Lymphadenopathy:        Head (right side): No submental, no submandibular and no tonsillar adenopathy present. Head (left side): No submental, no submandibular and no tonsillar adenopathy present.      He has no cervical adenopathy. Neurological: He is alert and oriented to person, place, and time. Skin: Skin is warm and dry. Lesion noted.   ~0.5cm ulcer plantar surface of left 4th toe, no drainage. Toe is swollen, dark brown discoloration to toe and top of foot. Corn noted later surface of left 5th toe   Psychiatric: He has a normal mood and affect. His behavior is normal. Judgment and thought content normal.     ASSESSMENT and PLAN    ICD-10-CM ICD-9-CM    1. Type 2 diabetes mellitus with complication, without long-term current use of insulin ([facility]) E11.8 250.90 CBC WITH AUTOMATED DIFF      METABOLIC PANEL, COMPREHENSIVE      AMB POC HEMOGLOBIN A1C      LIPID PANEL      MICROALBUMIN, UR, RAND W/ MICROALB/CREAT RATIO      linagliptin (TRADJENTA) 5 mg tablet      glipiZIDE (GLUCOTROL) 10 mg tablet      metFORMIN ER (GLUCOPHAGE XR) 500 mg tablet      gabapentin (NEURONTIN) 300 mg capsule   2. Diabetic ulcer of toe of left foot associated with type 2 diabetes mellitus, unspecified ulcer stage ([facility]) E11.621 250.80 XR 4TH TOE LT MIN 2 V    L97.529 707.15    3. Essential hypertension I10 401.9 CBC WITH AUTOMATED DIFF      METABOLIC PANEL, COMPREHENSIVE      lisinopril (PRINIVIL, ZESTRIL) 40 mg tablet   4. Hypercholesterolemia E78.00 272.0 pravastatin (PRAVACHOL) 40 mg tablet   5. Hypercalcemia Q37.40 748.51 METABOLIC PANEL, COMPREHENSIVE   6. Tobacco abuse Z72.0 305.1 varenicline (CHANTIX STARTER ISHMAEL) 0.5 mg (11)- 1 mg (42) DsPk   7. Osteomyelitis of left foot, unspecified type ([address] M86.9 730.27 REFERRAL TO PODIATRY     Encounter Diagnoses   Name Primary?     Type 2 diabetes mellitus with complication, without long-term current use of insulin ([facility]) Yes    Diabetic ulcer of toe of left foot associated with type 2 diabetes mellitus, unspecified ulcer stage ([address]     Essential hypertension     Hypercholesterolemia     Hypercalcemia     Tobacco abuse     Osteomyelitis of left foot, unspecified type ([address]      Orders Placed This Encounter    XR 4TH TOE LT MIN 2 V    CBC WITH AUTOMATED DIFF    METABOLIC PANEL, COMPREHENSIVE    LIPID PANEL    MICROALBUMIN, UR, RAND W/ MICROALB/CREAT RATIO    REFERRAL TO PODIATRY    AMB POC HEMOGLOBIN A1C    pravastatin (PRAVACHOL) 40 mg tablet    lisinopril (PRINIVIL, ZESTRIL) 40 mg tablet    linagliptin (TRADJENTA) 5 mg tablet    glipiZIDE (GLUCOTROL) 10 mg tablet    metFORMIN ER (GLUCOPHAGE XR) 500 mg tablet    gabapentin (NEURONTIN) 300 mg capsule    varenicline (CHANTIX STARTER ISHMAEL) 0.5 mg (11)- 1 mg (42) DsPk     Diagnoses and all orders for this visit:    1. Type 2 diabetes mellitus with complication, without long-term current use of insulin ([facility]) - A1c 12.4%, has not been on medication for months. Encouraged patient to aim for 45g carbohydrate per meal, eliminate the juice and all sweet drinks. Patient to monitor BG twice daily, fasting daily and varying times before lunch or dinner, and bedtime. .  Bring readings at next office visit in 4 weeks. Discussed long term effects of uncontrolled diabetes, including but not limited to stroke, heart attack, kidney failure, blindness, amputation. Will check fructosamine in 1 month. -     CBC WITH AUTOMATED DIFF  -     METABOLIC PANEL, COMPREHENSIVE  -     AMB POC HEMOGLOBIN A1C  -     LIPID PANEL  -     MICROALBUMIN, UR, RAND W/ MICROALB/CREAT RATIO  -     linagliptin (TRADJENTA) 5 mg tablet; TAKE ONE TABLET BY MOUTH ONCE DAILY  -     glipiZIDE (GLUCOTROL) 10 mg tablet; TAKE ONE TABLET BY MOUTH TWICE DAILY WITH MEALS  -     metFORMIN ER (GLUCOPHAGE XR) 500 mg tablet; TAKE TWO TABLETS WITH BREAKFAST AND TWO TABLETS WITH  DINNER  -     gabapentin (NEURONTIN) 300 mg capsule; TAKE ONE CAPSULE BY MOUTH THREE TIMES DAILY    2. Diabetic ulcer of toe of left foot associated with type 2 diabetes mellitus, unspecified ulcer stage (H[facility]) - check xray to r/o osteomyelitis. May need to repeat abx and see podiatry  -     XR 4TH TOE LT MIN 2 V; Future    3. Essential hypertension - not at goal.  Has not been on medication for months. Increase lisinopril to 40mg  -     CBC WITH AUTOMATED DIFF  -     METABOLIC PANEL, COMPREHENSIVE  -     lisinopril (PRINIVIL, ZESTRIL) 40 mg tablet; TAKE ONE TABLET BY MOUTH ONCE DAILY    4. Hypercholesterolemia  -     pravastatin (PRAVACHOL) 40 mg tablet; TAKE 1 TABLET BY MOUTH NIGHTLY    5. Hypercalcemia - has been off HCTZ, if continues to be elevated, may need to check PTH  -     METABOLIC PANEL, COMPREHENSIVE    6. Tobacco abuse  -     varenicline (CHANTIX STARTER ISHMAEL) 0.5 mg (11)- 1 mg (42) DsPk; Per dose pack instructions    7. Osteomyelitis of left foot, unspecified type ([address] - xray positive for osteomyelitis. Referred to podiatry. Has appt tomorrow at 130p at [facility] in [place]. Patient notified. Will likely need ID as well. Follow-up Disposition:  Return in about 1 month (around 4/13/2019). lab results and schedule of future lab studies reviewed with patient  reviewed diet, exercise and weight control  very strongly urged to quit smoking to reduce cardiovascular risk  cardiovascular risk and specific lipid/LDL goals reviewed  radiology results and schedule of future radiology studies reviewed with patient    I have reviewed the patient's allergies and made any necessary changes. Medical, procedural, social and family histories have been reviewed and updated as medically indicated. I have reconciled and/or revised patient medications in the EMR. I have discussed each diagnosis listed in this note with Myla Nunez and/or their family. I have discussed treatment options and the risk/benefit analysis of those options, including safe use of medications and possible medication side effects. Through the use of shared decision making we have agreed to the above plan. The patient has received an after-visit summary and questions were answered concerning future plans. Joslyn Burgess, FNP-C    This note will not be viewable in MyChart.

## 2019-03-14 LAB
ALBUMIN SERPL-MCNC: 4.6 G/DL (ref 3.5–5.5)
ALBUMIN/CREAT UR: 50.4 MG/G CREAT (ref 0–30)
ALBUMIN/GLOB SERPL: 1.7 {RATIO} (ref 1.2–2.2)
ALP SERPL-CCNC: 114 IU/L (ref 39–117)
ALT SERPL-CCNC: 39 IU/L (ref 0–44)
AST SERPL-CCNC: 19 IU/L (ref 0–40)
BASOPHILS # BLD AUTO: 0 X10E3/UL (ref 0–0.2)
BASOPHILS NFR BLD AUTO: 0 %
BILIRUB SERPL-MCNC: 0.2 MG/DL (ref 0–1.2)
BUN SERPL-MCNC: 15 MG/DL (ref 6–24)
BUN/CREAT SERPL: 15 (ref 9–20)
CALCIUM SERPL-MCNC: 11.5 MG/DL (ref 8.7–10.2)
CHLORIDE SERPL-SCNC: 101 MMOL/L (ref 96–106)
CHOLEST SERPL-MCNC: 215 MG/DL (ref 100–199)
CO2 SERPL-SCNC: 23 MMOL/L (ref 20–29)
CREAT SERPL-MCNC: 0.99 MG/DL (ref 0.76–1.27)
CREAT UR-MCNC: 94.7 MG/DL
EOSINOPHIL # BLD AUTO: 0.2 X10E3/UL (ref 0–0.4)
EOSINOPHIL NFR BLD AUTO: 2 %
ERYTHROCYTE [DISTWIDTH] IN BLOOD BY AUTOMATED COUNT: 15.9 % (ref 12.3–15.4)
GLOBULIN SER CALC-MCNC: 2.7 G/DL (ref 1.5–4.5)
GLUCOSE SERPL-MCNC: 265 MG/DL (ref 65–99)
HCT VFR BLD AUTO: 43.6 % (ref 37.5–51)
HDLC SERPL-MCNC: 40 MG/DL
HGB BLD-MCNC: 15 G/DL (ref 13–17.7)
IMM GRANULOCYTES # BLD AUTO: 0 X10E3/UL (ref 0–0.1)
IMM GRANULOCYTES NFR BLD AUTO: 0 %
INTERPRETATION, 910389: NORMAL
LDLC SERPL CALC-MCNC: 162 MG/DL (ref 0–99)
LYMPHOCYTES # BLD AUTO: 2.5 X10E3/UL (ref 0.7–3.1)
LYMPHOCYTES NFR BLD AUTO: 27 %
Lab: NORMAL
Lab: NORMAL
MCH RBC QN AUTO: 26.5 PG (ref 26.6–33)
MCHC RBC AUTO-ENTMCNC: 34.4 G/DL (ref 31.5–35.7)
MCV RBC AUTO: 77 FL (ref 79–97)
MICROALBUMIN UR-MCNC: 47.7 UG/ML
MONOCYTES # BLD AUTO: 0.7 X10E3/UL (ref 0.1–0.9)
MONOCYTES NFR BLD AUTO: 8 %
NEUTROPHILS # BLD AUTO: 5.8 X10E3/UL (ref 1.4–7)
NEUTROPHILS NFR BLD AUTO: 63 %
PLATELET # BLD AUTO: 443 X10E3/UL (ref 150–379)
POTASSIUM SERPL-SCNC: 4.8 MMOL/L (ref 3.5–5.2)
PROT SERPL-MCNC: 7.3 G/DL (ref 6–8.5)
RBC # BLD AUTO: 5.66 X10E6/UL (ref 4.14–5.8)
SODIUM SERPL-SCNC: 139 MMOL/L (ref 134–144)
TRIGL SERPL-MCNC: 63 MG/DL (ref 0–149)
VLDLC SERPL CALC-MCNC: 13 MG/DL (ref 5–40)
WBC # BLD AUTO: 9.2 X10E3/UL (ref 3.4–10.8)

## 2019-03-18 NOTE — PROGRESS NOTES
RECOMMENDATIONS:  Hemoglobin A1c is a 3 month marker of your diabetes control. Goal is less than 7% which means your average blood sugar is less than 150. Your Hemoglobin A1c is 12.5%, which means your diabetes is under POOR control. Restart medications as discussed in office. Do your best to eliminate the juice and all sweet drinks and try to eat no more than 45 grams of carbs per meal. Continue to work on your diet and exercise. Calcium level is still high. I would like to check couple other labs. Please call the office and schedule a lab only visit in the next 1-2 weeks. Kidney function stable. Cholesterol numbers high. Restart cholesterol medication.

## 2019-03-26 ENCOUNTER — HOSPITAL ENCOUNTER (OUTPATIENT)
Dept: MRI IMAGING | Age: 49
Discharge: HOME | End: 2019-03-26
Attending: PODIATRIST
Payer: COMMERCIAL

## 2019-03-26 DIAGNOSIS — M86.9 OSTEOMYELITIS OF LEFT FOOT, UNSPECIFIED TYPE (HCC): ICD-10-CM

## 2019-03-26 PROCEDURE — A9575 INJ GADOTERATE MEGLUMI 0.1ML: HCPCS | Performed by: PODIATRIST

## 2019-03-26 PROCEDURE — 74011250636 HC RX REV CODE- 250/636: Performed by: PODIATRIST

## 2019-03-26 PROCEDURE — 73720 MRI LWR EXTREMITY W/O&W/DYE: CPT

## 2019-03-26 RX ORDER — GADOTERATE MEGLUMINE 376.9 MG/ML
20 INJECTION INTRAVENOUS
Status: COMPLETED | OUTPATIENT
Start: 2019-03-26 | End: 2019-03-26

## 2019-03-26 RX ADMIN — GADOTERATE MEGLUMINE 20 ML: 376.9 INJECTION INTRAVENOUS at 15:39

## 2019-03-31 ENCOUNTER — ANESTHESIA EVENT (OUTPATIENT)
Dept: SURGERY | Age: 49
End: 2019-03-31
Payer: COMMERCIAL

## 2019-04-01 ENCOUNTER — ANESTHESIA (OUTPATIENT)
Dept: SURGERY | Age: 49
End: 2019-04-01
Payer: COMMERCIAL

## 2019-04-01 ENCOUNTER — HOSPITAL ENCOUNTER (OUTPATIENT)
Age: 49
Setting detail: OUTPATIENT SURGERY
Discharge: HOME | End: 2019-04-01
Attending: PODIATRIST | Admitting: PODIATRIST
Payer: COMMERCIAL

## 2019-04-01 VITALS
BODY MASS INDEX: 26.73 KG/M2 | RESPIRATION RATE: 18 BRPM | TEMPERATURE: 97.8 F | OXYGEN SATURATION: 97 % | HEIGHT: 75 IN | SYSTOLIC BLOOD PRESSURE: 171 MMHG | WEIGHT: 215 LBS | DIASTOLIC BLOOD PRESSURE: 98 MMHG | HEART RATE: 77 BPM

## 2019-04-01 DIAGNOSIS — M86.172 OTHER ACUTE OSTEOMYELITIS OF LEFT FOOT (HCC): Primary | ICD-10-CM

## 2019-04-01 LAB
GLUCOSE BLD STRIP.AUTO-MCNC: 93 MG/DL (ref 65–100)
SERVICE CMNT-IMP: NORMAL

## 2019-04-01 PROCEDURE — 77030000032 HC CUF TRNQT ZIMM -B: Performed by: PODIATRIST

## 2019-04-01 PROCEDURE — 87075 CULTR BACTERIA EXCEPT BLOOD: CPT

## 2019-04-01 PROCEDURE — 76210000020 HC REC RM PH II FIRST 0.5 HR: Performed by: PODIATRIST

## 2019-04-01 PROCEDURE — 88305 TISSUE EXAM BY PATHOLOGIST: CPT

## 2019-04-01 PROCEDURE — 77030031139 HC SUT VCRL2 J&J -A: Performed by: PODIATRIST

## 2019-04-01 PROCEDURE — 88311 DECALCIFY TISSUE: CPT

## 2019-04-01 PROCEDURE — 74011000250 HC RX REV CODE- 250: Performed by: PODIATRIST

## 2019-04-01 PROCEDURE — 74011250636 HC RX REV CODE- 250/636

## 2019-04-01 PROCEDURE — 76210000063 HC OR PH I REC FIRST 0.5 HR: Performed by: PODIATRIST

## 2019-04-01 PROCEDURE — 76010000149 HC OR TIME 1 TO 1.5 HR: Performed by: PODIATRIST

## 2019-04-01 PROCEDURE — 77030002916 HC SUT ETHLN J&J -A: Performed by: PODIATRIST

## 2019-04-01 PROCEDURE — 76060000033 HC ANESTHESIA 1 TO 1.5 HR: Performed by: PODIATRIST

## 2019-04-01 PROCEDURE — 82962 GLUCOSE BLOOD TEST: CPT

## 2019-04-01 PROCEDURE — 77030018836 HC SOL IRR NACL ICUM -A: Performed by: PODIATRIST

## 2019-04-01 PROCEDURE — C1713 ANCHOR/SCREW BN/BN,TIS/BN: HCPCS | Performed by: PODIATRIST

## 2019-04-01 PROCEDURE — 77030011640 HC PAD GRND REM COVD -A: Performed by: PODIATRIST

## 2019-04-01 PROCEDURE — 77030006788 HC BLD SAW OSC STRY -B: Performed by: PODIATRIST

## 2019-04-01 PROCEDURE — 74011250636 HC RX REV CODE- 250/636: Performed by: PODIATRIST

## 2019-04-01 PROCEDURE — 87205 SMEAR GRAM STAIN: CPT

## 2019-04-01 PROCEDURE — 74011000272 HC RX REV CODE- 272: Performed by: PODIATRIST

## 2019-04-01 DEVICE — STIMULAN® RAPID CURE PROVIDED STERILE FOR SINGLE PATIENT USE. STIMULAN® RAPID CURE CONTAINS CALCIUM SULFATE POWDER AND MIXING SOLUTION IN PRE-MEASURED QUANTITIES SO THAT WHEN MIXED TOGETHER IN A STERILE MIXING BOWL, THE RESULTANT PASTE IS TO BE DIGITALLY PACKED INTO OPEN BONE VOID/GAP TO SET INSITU OR PLACED INTO THE MOULD PROVIDED, THE MIXTURE SETS TO FORM BEADS. THE BIODEGRADABLE, RADIOPAQUE BEADS ARE RESORBED IN APPROXIMATELY 30 – 60 DAYS WHEN USED IN ACCORDANCE WITH THE DEVICE LABELLING. STIMULAN® RAPID CURE IS MANUFACTURED FROM SYNTHETIC IMPLANT GRADE CALCIUM SULFATE DIHYDRATE(CASO4.2H2O) THAT RESORBS AND IS REPLACED WITH BONE DURING THE HEALING PROCESS. ALSO, AS THE BONE VOID FILLER BEADS ARE BIODEGRADABLE AND BIOCOMPATIBLE, THEY MAY BE USED AT AN INFECTED SITE.
Type: IMPLANTABLE DEVICE | Site: TOE | Status: FUNCTIONAL
Brand: STIMULAN® RAPID CURE

## 2019-04-01 RX ORDER — BUPIVACAINE HYDROCHLORIDE AND EPINEPHRINE 5; 5 MG/ML; UG/ML
INJECTION, SOLUTION EPIDURAL; INTRACAUDAL; PERINEURAL AS NEEDED
Status: DISCONTINUED | OUTPATIENT
Start: 2019-04-01 | End: 2019-04-01 | Stop reason: HOSPADM

## 2019-04-01 RX ORDER — LIDOCAINE HYDROCHLORIDE 20 MG/ML
INJECTION, SOLUTION INFILTRATION; PERINEURAL AS NEEDED
Status: DISCONTINUED | OUTPATIENT
Start: 2019-04-01 | End: 2019-04-01 | Stop reason: HOSPADM

## 2019-04-01 RX ORDER — CEFAZOLIN SODIUM IN 0.9 % NACL 2 G/100 ML
PLASTIC BAG, INJECTION (ML) INTRAVENOUS
Status: COMPLETED
Start: 2019-04-01 | End: 2019-04-01

## 2019-04-01 RX ORDER — FENTANYL CITRATE 50 UG/ML
INJECTION, SOLUTION INTRAMUSCULAR; INTRAVENOUS AS NEEDED
Status: DISCONTINUED | OUTPATIENT
Start: 2019-04-01 | End: 2019-04-01 | Stop reason: HOSPADM

## 2019-04-01 RX ORDER — CEFAZOLIN SODIUM IN 0.9 % NACL 2 G/100 ML
PLASTIC BAG, INJECTION (ML) INTRAVENOUS AS NEEDED
Status: DISCONTINUED | OUTPATIENT
Start: 2019-04-01 | End: 2019-04-01 | Stop reason: HOSPADM

## 2019-04-01 RX ORDER — PROPOFOL 10 MG/ML
INJECTION, EMULSION INTRAVENOUS
Status: DISCONTINUED | OUTPATIENT
Start: 2019-04-01 | End: 2019-04-01 | Stop reason: HOSPADM

## 2019-04-01 RX ORDER — MIDAZOLAM HYDROCHLORIDE 1 MG/ML
INJECTION, SOLUTION INTRAMUSCULAR; INTRAVENOUS AS NEEDED
Status: DISCONTINUED | OUTPATIENT
Start: 2019-04-01 | End: 2019-04-01 | Stop reason: HOSPADM

## 2019-04-01 RX ORDER — VANCOMYCIN HYDROCHLORIDE 1 G/20ML
INJECTION, POWDER, LYOPHILIZED, FOR SOLUTION INTRAVENOUS AS NEEDED
Status: DISCONTINUED | OUTPATIENT
Start: 2019-04-01 | End: 2019-04-01 | Stop reason: HOSPADM

## 2019-04-01 RX ORDER — PROPOFOL 10 MG/ML
INJECTION, EMULSION INTRAVENOUS AS NEEDED
Status: DISCONTINUED | OUTPATIENT
Start: 2019-04-01 | End: 2019-04-01 | Stop reason: HOSPADM

## 2019-04-01 RX ORDER — SULFAMETHOXAZOLE AND TRIMETHOPRIM 800; 160 MG/1; MG/1
1 TABLET ORAL 2 TIMES DAILY
Qty: 10 TAB | Refills: 1 | Status: SHIPPED | OUTPATIENT
Start: 2019-04-01 | End: 2019-04-16 | Stop reason: ALTCHOICE

## 2019-04-01 RX ORDER — OXYCODONE AND ACETAMINOPHEN 5; 325 MG/1; MG/1
1 TABLET ORAL
Qty: 40 TAB | Refills: 0 | Status: SHIPPED | OUTPATIENT
Start: 2019-04-01 | End: 2019-04-04

## 2019-04-01 RX ADMIN — FENTANYL CITRATE 50 MCG: 50 INJECTION, SOLUTION INTRAMUSCULAR; INTRAVENOUS at 07:20

## 2019-04-01 RX ADMIN — MIDAZOLAM HYDROCHLORIDE 1 MG: 1 INJECTION, SOLUTION INTRAMUSCULAR; INTRAVENOUS at 07:20

## 2019-04-01 RX ADMIN — PROPOFOL 75 MCG/KG/MIN: 10 INJECTION, EMULSION INTRAVENOUS at 07:25

## 2019-04-01 RX ADMIN — PROPOFOL 60 MG: 10 INJECTION, EMULSION INTRAVENOUS at 07:25

## 2019-04-01 RX ADMIN — MIDAZOLAM HYDROCHLORIDE 2 MG: 1 INJECTION, SOLUTION INTRAMUSCULAR; INTRAVENOUS at 07:15

## 2019-04-01 RX ADMIN — FENTANYL CITRATE 50 MCG: 50 INJECTION, SOLUTION INTRAMUSCULAR; INTRAVENOUS at 07:24

## 2019-04-01 RX ADMIN — MIDAZOLAM HYDROCHLORIDE 1 MG: 1 INJECTION, SOLUTION INTRAMUSCULAR; INTRAVENOUS at 07:24

## 2019-04-01 RX ADMIN — LIDOCAINE HYDROCHLORIDE 60 MG: 20 INJECTION, SOLUTION INFILTRATION; PERINEURAL at 07:24

## 2019-04-01 RX ADMIN — Medication 2 G: at 07:08

## 2019-04-01 NOTE — PERIOP NOTES
Handoff Report from Operating Room to PACU Report received from K. Jenny Babinski, RN and Dr. Suhas Leung regarding Kathleen Gates. Surgeon(s): 
Ashtyn Lafleur DPM  And Procedure(s) (LRB): 
AMPUTATION OF FOURTH LEFT TOE AND PARTIAL METATARSAL WITH INSERTION OF ANTIBIOTIC BEADS (Left)  confirmed  
with allergies and dressings discussed. Anesthesia type, drugs, patient history, complications, estimated blood loss, vital signs, intake and output, and last pain medication, lines and temperature were reviewed.

## 2019-04-01 NOTE — H&P
History and Physical 
 
Subjective:  
 
Colt Upton is a 46 y.o.  male who presents with an infected 4th left toe of unknown duration. Onset of symptoms was gradual with gradually worsening course since that time. Patient denies any trauma. Previous studies include xray and MRI. Past Medical History:  
Diagnosis Date  DM (diabetes mellitus) ([facility] 2010  
 HTN (hypertension) 2010  Hypercalcemia 3/13/2019  Hypertension  Osteomyelitis of left foot ([facility] 3/13/2019  Tobacco abuse 3/13/2019  Vitamin D deficiency 2017 Past Surgical History:  
Procedure Laterality Date  HX ORTHOPAEDIC    
 right arm surgery as a child  HX OTHER SURGICAL    
 scrotal abscess drainage Family History Problem Relation Age of Onset  Diabetes Mother  Hypertension Mother  Cancer Father  Diabetes Sister   
      at age 28y from MI, stroke, kidney failure  Hypertension Sister  Kidney Disease Sister  Stroke Sister  Heart Attack Sister Social History Tobacco Use  Smoking status: Current Every Day Smoker Packs/day: 1.00  Smokeless tobacco: Never Used Substance Use Topics  Alcohol use: Yes Comment: occ Prior to Admission medications Medication Sig Start Date End Date Taking?  Authorizing Provider  
pravastatin (PRAVACHOL) 40 mg tablet TAKE 1 TABLET BY MOUTH NIGHTLY 3/13/19  Yes Joslyn Burgess NP  
lisinopril (PRINIVIL, ZESTRIL) 40 mg tablet TAKE ONE TABLET BY MOUTH ONCE DAILY 3/13/19  Yes Amanda Burgess NP  
linagliptin (TRADJENTA) 5 mg tablet TAKE ONE TABLET BY MOUTH ONCE DAILY 3/13/19  Yes Amanda Burgess NP  
glipiZIDE (GLUCOTROL) 10 mg tablet TAKE ONE TABLET BY MOUTH TWICE DAILY WITH MEALS 3/13/19  Yes Amanda Burgess NP  
metFORMIN ER (GLUCOPHAGE XR) 500 mg tablet TAKE TWO TABLETS WITH BREAKFAST AND TWO TABLETS WITH  DINNER 3/13/19  Yes Lorenzo Connelly NP  
 gabapentin (NEURONTIN) 300 mg capsule TAKE ONE CAPSULE BY MOUTH THREE TIMES DAILY 3/13/19  Yes Enoch Burgess NP  
varenicline (CHANTIX STARTER ISHMAEL) 0.5 mg (11)- 1 mg (42) DsPk Per dose pack instructions 3/13/19  Yes Dory Bowels, NP Blood-Glucose Meter monitoring kit MONITOR BG TWICE DAILY  DX E11.8  TYPE 2 DM Patient not taking: Reported on 3/13/2019 5/24/17   Dory Polk NP Lancets misc MONITOR BG TWICE DAILY  DX E11.8  TYPE 2 DM Patient not taking: Reported on 3/13/2019 5/24/17   America MONZON NP  
glucose blood VI test strips (BLOOD GLUCOSE TEST) strip MONITOR BG TWICE DAILY  DX E11.8  TYPE 2 DM Patient not taking: Reported on 3/13/2019 5/24/17   Dory Polk NP Allergies Allergen Reactions  Januvia [Sitagliptin] Diarrhea and Nausea and Vomiting Review of Systems: A comprehensive review of systems was negative. Wears glasses Objective: Intake and Output:   
No intake/output data recorded. No intake/output data recorded. Physical Exam:  
There were no vitals taken for this visit. General:  Alert, cooperative, no distress, appears stated age. Head:  Normocephalic, without obvious abnormality, atraumatic. Eyes:  Conjunctivae/corneas clear. PERRL, Ears:  Normal  external ear canals both ears. Nose: Nares normal. Septum midline. Mucosa normal. No drainage or sinus tenderness. Throat: Lips, mucosa, and tongue normal. Full upper and lower partials Neck: Supple, symmetrical, trachea midline, no adenopathy, thyroid: no enlargement/tenderness/nodules,   
Back:   Symmetric, no curvature. ROM normal.   
Lungs:   Clear to auscultation bilaterally. Chest wall:  No tenderness or deformity. Heart:  Regular rate and rhythm, S1, S2 normal, no murmur, click, rub or gallop. Abdomen:   Soft, non-tender. Bowel sounds normal. No masses,  No organomegaly. Extremities: Extremities normal, atraumatic, no cyanosis or edema. Pulses: 2+ and symmetric all extremities. Skin: Skin color, texture, turgor normal. No rashes or lesions Lymph nodes: Cervical, supraclavicular, and axillary nodes normal.  
Neurologic: CNII-XII intact. Normal strength,  and reflexes throughout. Data Review: No results found for this or any previous visit (from the past 24 hour(s)). Assessment:  
 
Active Problems: * No active hospital problems. * 
osteomyelitis 4th left toe Plan:  
Scheduled amputation of 4th toe and partial 4th metatarsal with insertion of antibiotic beads. Discussed risks, benefits, expected as well as post op course. Signed By: Fabrice AdjutantREBEL   
 April 1, 2019

## 2019-04-01 NOTE — ANESTHESIA POSTPROCEDURE EVALUATION
Procedure(s): 
AMPUTATION OF FOURTH LEFT TOE AND PARTIAL METATARSAL WITH INSERTION OF ANTIBIOTIC BEADS. MAC Anesthesia Post Evaluation Multimodal analgesia: multimodal analgesia not used between 6 hours prior to anesthesia start to PACU discharge Patient location during evaluation: PACU Patient participation: complete - patient participated Level of consciousness: awake and alert Pain management: adequate Airway patency: patent Anesthetic complications: no 
Cardiovascular status: acceptable Respiratory status: acceptable Hydration status: acceptable Post anesthesia nausea and vomiting:  none Vitals Value Taken Time /97 4/1/2019  8:30 AM  
Temp 36.9 °C (98.4 °F) 4/1/2019  8:17 AM  
Pulse 78 4/1/2019  8:32 AM  
Resp 15 4/1/2019  8:32 AM  
SpO2 96 % 4/1/2019  8:32 AM  
Vitals shown include unvalidated device data.

## 2019-04-01 NOTE — OP NOTES
[address]  OPERATIVE REPORT    Name:  Meena Morris  MR#:  [id number]  :  1970  ACCOUNT #:  [de-identified]  DATE OF SERVICE:  2019    PREOPERATIVE DIAGNOSIS:  Osteomyelitis, fourth left toe and distal fourth metatarsal.    POSTOPERATIVE DIAGNOSIS:  Osteomyelitis, fourth left toe and distal fourth metatarsal.    PROCEDURE PERFORMED:  Amputation of fourth left toe with partial metatarsal and insertion of antibiotic beads. SURGEON:  Ashtyn Thakkar DPM    ASSISTANT:  none  ANESTHESIA:  IV sedation with local.    COMPLICATIONS:  none    SPECIMENS REMOVED:  Wound cultures as well as fourth toe and distal fourth metatarsal.    IMPLANTS:  none    ESTIMATED BLOOD LOSS:  Minimal.    INDICATIONS:  This 57-year-old black male presented to the office for painful ulceration fourth left toe of unknown duration. Conservative therapy failed to alleviate the patient of his symptoms. At this time, surgical intervention has been opted as the treatment of his choice. Medical history and physical has been performed. The patient agrees to surgery. Physical examination reveals palpable pedal pulses with fairly good muscle strength in the lower extremities, bilateral.  Diminished epicritic sensation. He has a contracted fifth left toe with hyperkeratosis, lateral aspect of the fifth DIPJ. The fourth left toe is swollen and there is an ulceration lateral aspect of the PIPJ. X-rays taken reveal bone destruction of the fourth left proximal phalanx indicating osteomyelitis. An MRI indicates osteomyelitis of the fourth left toe and metatarsal head. DESCRIPTION OF PROCEDURE:  The patient was brought into the operating room, placed on the operating table in the supine position. Under the influence of IV sedation, anesthesia was achieved using 0.05% Marcaine with epinephrine. Left foot was now prepped and draped in the usual sterile manner. A successful time-out was completed.   Next, using a #15 blade, the fourth left toe was disarticulated at the MPJ and using a sagittal saw, distal one-third of the metatarsal was resected. Aerobic as well as anaerobic wound cultures were taken. The wound was now flushed with copious amounts of sterile saline with bacitracin dissolved in it. Next, Stimulan antibiotic beads made with 1 g vancomycin was packed into the wound. All skin edges were now loosely coapted using 3-0 nylon with horizontal mattress sutures. Betadine-soaked Adaptic followed by a sterile compressive dressing was now placed on the left foot. Pneumatic ankle tourniquet was released and good color return was noted to all digits 1 through 5, left foot. Preoperatively, he was given 2 g Ancef IV. The patient tolerated anesthesia and procedure well and was transferred from the operating room to recovery with all vital signs stable and vascular status intact to all digits 1 through 5, left foot.       REBEL [id number]  D:  04/01/2019 8:28  T:  04/01/2019 11:22  JOB #:  [id number]

## 2019-04-01 NOTE — PERIOP NOTES
Discharge instructions completed with patient and wife, understanding verbalized. No questions or concerns.

## 2019-04-01 NOTE — BRIEF OP NOTE
BRIEF OPERATIVE NOTE Date of Procedure: 4/1/2019 Preoperative Diagnosis: Osteomyelitis of fourth left toe and partial metatarsal 
Postoperative Diagnosis: osteomyelitis of fourth left toe and partial metatarsal   
Procedure(s): 
AMPUTATION OF FOURTH LEFT TOE AND PARTIAL METATARSAL WITH INSERTION OF ANTIBIOTIC BEADS Surgeon(s) and Role: Sheryle Gary, DPM - Primary Surgical Assistant: none Surgical Staff: 
Circ-1: Bruce Herrera RN Surg Asst-1: Brandon Moeller Time In Time Out Incision Start 2002 Incision Close 0807 Anesthesia: MAC Estimated Blood Loss: none Specimens:  
ID Type Source Tests Collected by Time Destination 1 : left fourth toe and left partial metatarsal Preservative   Cal Taveras [facility] 4/1/2019 1662 Pathology 1 : left fourth toe Wound  CULTURE, ANAEROBIC, CULTURE, WOUND [facility] [place], [place] 4/1/2019 8428 Microbiology Findings: infected 4th left toe Complications: none Implants:  
Implant Name Type Inv. Item Serial No.  Lot No. LRB No. Used Action GRAFT BNE PASTE RAPID CUR 10ML -- [employer] - [id number]  GRAFT BNE PASTE RAPID CUR 10ML -- [employer] [id number] [employer] [id number] Left 1 Implanted

## 2019-04-01 NOTE — ANESTHESIA PREPROCEDURE EVALUATION
Relevant Problems No relevant active problems Anesthetic History No history of anesthetic complications Review of Systems / Medical History Patient summary reviewed and pertinent labs reviewed Pulmonary Smoker Neuro/Psych Within defined limits Cardiovascular Hypertension Exercise tolerance: >4 METS 
  
GI/Hepatic/Renal 
Within defined limits Endo/Other Diabetes Other Findings Physical Exam 
 
Airway Mallampati: I 
TM Distance: 4 - 6 cm Neck ROM: normal range of motion Cardiovascular Rhythm: regular Rate: normal 
 
 
 
 Dental 
 
Dentition: Full upper dentures and Full lower dentures Pulmonary Breath sounds clear to auscultation Abdominal 
GI exam deferred Other Findings Anesthetic Plan ASA: 2 Anesthesia type: MAC Anesthetic plan and risks discussed with: Patient

## 2019-04-03 LAB
BACTERIA SPEC CULT: ABNORMAL
BACTERIA SPEC CULT: ABNORMAL
GRAM STN SPEC: ABNORMAL
GRAM STN SPEC: ABNORMAL
SERVICE CMNT-IMP: ABNORMAL

## 2019-04-05 LAB
BACTERIA SPEC CULT: NORMAL
SERVICE CMNT-IMP: NORMAL

## 2019-04-12 ENCOUNTER — OFFICE VISIT (OUTPATIENT)
Dept: FAMILY MEDICINE CLINIC | Age: 49
End: 2019-04-12

## 2019-04-12 VITALS
BODY MASS INDEX: 26.73 KG/M2 | HEIGHT: 75 IN | HEART RATE: 90 BPM | SYSTOLIC BLOOD PRESSURE: 168 MMHG | RESPIRATION RATE: 18 BRPM | OXYGEN SATURATION: 99 % | DIASTOLIC BLOOD PRESSURE: 96 MMHG | TEMPERATURE: 97.7 F | WEIGHT: 215 LBS

## 2019-04-12 DIAGNOSIS — Z89.422 S/P AMPUTATION OF LESSER TOE, LEFT (HCC): ICD-10-CM

## 2019-04-12 DIAGNOSIS — M86.9 OSTEOMYELITIS OF LEFT FOOT, UNSPECIFIED TYPE (HCC): ICD-10-CM

## 2019-04-12 DIAGNOSIS — E83.52 HYPERCALCEMIA: ICD-10-CM

## 2019-04-12 DIAGNOSIS — I10 ESSENTIAL HYPERTENSION: ICD-10-CM

## 2019-04-12 DIAGNOSIS — E11.8 TYPE 2 DIABETES MELLITUS WITH COMPLICATION, WITHOUT LONG-TERM CURRENT USE OF INSULIN (HCC): Primary | ICD-10-CM

## 2019-04-12 RX ORDER — AMLODIPINE BESYLATE 10 MG/1
10 TABLET ORAL DAILY
Qty: 90 TAB | Refills: 2 | Status: SHIPPED | OUTPATIENT
Start: 2019-04-12 | End: 2019-09-14 | Stop reason: SDUPTHER

## 2019-04-12 NOTE — PROGRESS NOTES
Chief Complaint Patient presents with  Diabetes Pt concerned with high BP. 
 
1. Have you been to the ER, urgent care clinic since your last visit? Hospitalized since your last visit? No 
 
2. Have you seen or consulted any other health care providers outside of the [address] [place] [address] since your last visit? Include any pap smears or colon screening. Yes, Podiatry Follow Up 4/11 Health Maintenance Due Topic Date Due  Pneumococcal 0-64 years (1 of 1 - PPSV23) 07/10/1974  
 EYE EXAM RETINAL OR DILATED  07/10/1978  
 FOOT EXAM Q1  05/24/2018  Shingrix Vaccine Age 50> (1 of 2) 07/10/2018  FOBT Q 1 YEAR AGE 50-75  07/10/2018

## 2019-04-12 NOTE — PROGRESS NOTES
HISTORY OF PRESENT ILLNESS Merlyn Petty is a 46 y.o. male. HPI  Patient comes in today for follow up. 4/1/19 - left 4th toe amputation and partial metatarsal with insertion of antibiotic beads. Saw podiatry yesterday, wound healing well. Will have sutures removed next week. BP has been high, states he has not been able to do much walking or bike riding with recent toe amputation. Taking lisinopril. Tries to follow low sodium diet. Has not been checking BG regularly. Has only checked 3 times since his last visit. States he was getting fasting readings 90s. Has not been eating too much due to antibiotics, causes nausea. Taking metformin, glipizide, and tradjenta. Interested in seeing CDE for diabetes education. Allergies Allergen Reactions  Januvia [Sitagliptin] Diarrhea and Nausea and Vomiting Past Medical History:  
Diagnosis Date  DM (diabetes mellitus) ([facility] [address] 6/1/2010  
 HTN (hypertension) 6/1/2010  Hypercalcemia 3/13/2019  Hypertension  Osteomyelitis of left foot ([facility] 3/13/2019  Tobacco abuse 3/13/2019  Vitamin D deficiency 8/7/2017 Past Surgical History:  
Procedure Laterality Date  HX ORTHOPAEDIC    
 right arm surgery as a child  HX OTHER SURGICAL    
 scrotal abscess drainage Social History Socioeconomic History  Marital status:  Spouse name: Not on file  Number of children: Not on file  Years of education: Not on file  Highest education level: Not on file Occupational History  Not on file Social Needs  Financial resource strain: Not on file  Food insecurity:  
  Worry: Not on file Inability: Not on file  Transportation needs:  
  Medical: Not on file Non-medical: Not on file Tobacco Use  Smoking status: Current Every Day Smoker Packs/day: 1.00  Smokeless tobacco: Never Used Substance and Sexual Activity  Alcohol use: Yes Comment: occ  Drug use:  No  
  Sexual activity: Not on file Lifestyle  Physical activity:  
  Days per week: Not on file Minutes per session: Not on file  Stress: Not on file Relationships  Social connections:  
  Talks on phone: Not on file Gets together: Not on file Attends Mandaen service: Not on file Active member of club or organization: Not on file Attends meetings of clubs or organizations: Not on file Relationship status: Not on file  Intimate partner violence:  
  Fear of current or ex partner: Not on file Emotionally abused: Not on file Physically abused: Not on file Forced sexual activity: Not on file Other Topics Concern  Not on file Social History Narrative  Not on file Family History Problem Relation Age of Onset  Diabetes Mother  Hypertension Mother  Heart Failure Mother  Cancer Father  Diabetes Sister   
      at age 28y from MI, stroke, kidney failure  Hypertension Sister  Kidney Disease Sister  Stroke Sister  Heart Attack Sister  Breast Cancer Sister Current Outpatient Medications Medication Sig  pravastatin (PRAVACHOL) 40 mg tablet TAKE 1 TABLET BY MOUTH NIGHTLY  lisinopril (PRINIVIL, ZESTRIL) 40 mg tablet TAKE ONE TABLET BY MOUTH ONCE DAILY  linagliptin (TRADJENTA) 5 mg tablet TAKE ONE TABLET BY MOUTH ONCE DAILY  glipiZIDE (GLUCOTROL) 10 mg tablet TAKE ONE TABLET BY MOUTH TWICE DAILY WITH MEALS  metFORMIN ER (GLUCOPHAGE XR) 500 mg tablet TAKE TWO TABLETS WITH BREAKFAST AND TWO TABLETS WITH  DINNER  
 gabapentin (NEURONTIN) 300 mg capsule TAKE ONE CAPSULE BY MOUTH THREE TIMES DAILY (Patient taking differently: TAKE ONE CAPSULE BY MOUTH THREE TIMES DAILY AS NEEDED)  Blood-Glucose Meter monitoring kit MONITOR BG TWICE DAILY  DX E11.8  TYPE 2 DM  Lancets misc MONITOR BG TWICE DAILY  DX E11.8  TYPE 2 DM  
 glucose blood VI test strips (BLOOD GLUCOSE TEST) strip MONITOR BG TWICE DAILY  DX E11.8  TYPE 2 DM  trimethoprim-sulfamethoxazole (BACTRIM DS, SEPTRA DS) 160-800 mg per tablet Take 1 Tab by mouth two (2) times a day. (Patient not taking: Reported on 4/12/2019)  varenicline (CHANTIX STARTER ISHMAEL) 0.5 mg (11)- 1 mg (42) DsPk Per dose pack instructions (Patient not taking: Reported on 4/12/2019) No current facility-administered medications for this visit. Review of Systems Constitutional: Negative for chills and fever. Eyes: Negative for blurred vision. Respiratory: Negative for cough and shortness of breath. Cardiovascular: Negative for chest pain, palpitations and leg swelling. Gastrointestinal: Positive for nausea (with oral antibiotics). Negative for vomiting. Genitourinary: Negative for dysuria, frequency and urgency. Musculoskeletal: Negative for myalgias. Skin: Negative. Dressing on left foot from toe/partial metatarsal amputation Neurological: Positive for tingling (in tips of fingers). Negative for dizziness, sensory change, speech change, focal weakness and headaches. Endo/Heme/Allergies: Negative for polydipsia. Psychiatric/Behavioral: Positive for depression (situational related to recent amputation, states he is managing on own at this time. ). The patient is not nervous/anxious. Vitals:  
 04/12/19 6052 04/12/19 1417 BP: (!) 190/104 (!) 168/96 Pulse: 90 Resp: 18 Temp: 97.7 °F (36.5 °C) TempSrc: Oral   
SpO2: 99% Weight: 215 lb (97.5 kg) Height: 6' 3\" (1.905 m) Physical Exam  
Constitutional: He is oriented to person, place, and time. He appears well-developed and well-nourished. BP elevated today Cardiovascular: Normal rate. Pulmonary/Chest: Effort normal.  
Neurological: He is alert and oriented to person, place, and time. Skin: Skin is warm and dry. Dressing left foot intact, wearing walking boot Psychiatric: His behavior is normal. Thought content normal. Angry: down about left toe and partial metatarsal amputation, tearful. He exhibits a depressed mood. Vitals reviewed. ASSESSMENT and PLAN 
  ICD-10-CM ICD-9-CM 1. Type 2 diabetes mellitus with complication, without long-term current use of insulin ([facility]) E11.8 250.90 FRUCTOSAMINE 2. Essential hypertension I10 401.9 amLODIPine (NORVASC) 10 mg tablet 3. S/P amputation of lesser toe, left ([facility]) Z89.422 V49.72   
4. Osteomyelitis of left foot, unspecified type ([address] M86.9 730.27   
5. Hypercalcemia E83.52 275.42 CALCIUM  
   PTH INTACT  
   VITAMIN D, 25 HYDROXY Encounter Diagnoses Name Primary?  Type 2 diabetes mellitus with complication, without long-term current use of insulin ([address] Yes  Essential hypertension  S/P amputation of lesser toe, left ([address]  Osteomyelitis of left foot, unspecified type ([address]  Hypercalcemia Orders Placed This Encounter  FRUCTOSAMINE  CALCIUM  
 PTH INTACT  VITAMIN D, 25 HYDROXY  
 amLODIPine (NORVASC) 10 mg tablet Diagnoses and all orders for this visit: 
 
1. Type 2 diabetes mellitus with complication, without long-term current use of insulin ([facility]) - will check fructosamine. Appointment to see [facility] for CDE on 4/16/19 
-     FRUCTOSAMINE 2. Essential hypertension - not at goal.  Continue lisinopril. Add amlodipine. -     amLODIPine (NORVASC) 10 mg tablet; Take 1 Tab by mouth daily. 3. S/P amputation of lesser toe, left ([address] - followed by podiatry 4. Osteomyelitis of left foot, unspecified type ([address] - followed by podiatry 5. Hypercalcemia 
-     CALCIUM 
-     PTH INTACT 
-     VITAMIN D, 25 HYDROXY Follow-up and Dispositions · Return in about 6 weeks (around 5/24/2019), or if symptoms worsen or fail to improve. 
  
 
lab results and schedule of future lab studies reviewed with patient I have reviewed the patient's allergies and made any necessary changes. Medical, procedural, social and family histories have been reviewed and updated as medically indicated. I have reconciled and/or revised patient medications in the EMR. I have discussed each diagnosis listed in this note with Servando Lima and/or their family. I have discussed treatment options and the risk/benefit analysis of those options, including safe use of medications and possible medication side effects. Through the use of shared decision making we have agreed to the above plan. The patient has received an after-visit summary and questions were answered concerning future plans. Joslyn Burgess, LUPEP-C This note will not be viewable in [address]

## 2019-04-13 LAB
25(OH)D3+25(OH)D2 SERPL-MCNC: 13.1 NG/ML (ref 30–100)
CALCIUM SERPL-MCNC: 11.6 MG/DL (ref 8.7–10.2)
FRUCTOSAMINE SERPL-SCNC: 305 UMOL/L (ref 0–285)
PTH-INTACT SERPL-MCNC: 13 PG/ML (ref 15–65)

## 2019-04-15 ENCOUNTER — TELEPHONE (OUTPATIENT)
Dept: FAMILY MEDICINE CLINIC | Age: 49
End: 2019-04-15

## 2019-04-15 DIAGNOSIS — E55.9 VITAMIN D DEFICIENCY: ICD-10-CM

## 2019-04-15 DIAGNOSIS — E83.52 HYPERCALCEMIA: Primary | ICD-10-CM

## 2019-04-15 RX ORDER — ERGOCALCIFEROL 1.25 MG/1
50000 CAPSULE ORAL
Qty: 12 CAP | Refills: 0 | Status: SHIPPED | OUTPATIENT
Start: 2019-04-15 | End: 2019-09-13 | Stop reason: ALTCHOICE

## 2019-04-15 NOTE — PROGRESS NOTES
RECOMMENDATIONS: 
Calcium level is still high and PTH is not elevated. Will send you to a endocrinologist for further evaluation. Vitamin D is low. Please fill prescription for Vitamin D to take once weekly for 12 weeks. Once you have completed prescription, take an over-the-counter Vitamin D supplement 1,000 units daily. I have enclosed some information on Vitamin D deficiency. Fructosamine level is equivalent to a hemoglobin A1c of 6.8%, which is excellent control of diabetes now!

## 2019-04-15 NOTE — TELEPHONE ENCOUNTER
----- Message from Ayanna Hamilton NP sent at 4/15/2019 12:17 PM EDT -----  RECOMMENDATIONS:  Calcium level is still high and PTH is not elevated. Will send you to a endocrinologist for further evaluation. Vitamin D is low. Please fill prescription for Vitamin D to take once weekly for 12 weeks. Once you have completed prescription, take an over-the-counter Vitamin D supplement 1,000 units daily. I have enclosed some information on Vitamin D deficiency. Fructosamine level is equivalent to a hemoglobin A1c of 6.8%, which is excellent control of diabetes now!

## 2019-04-16 ENCOUNTER — OFFICE VISIT (OUTPATIENT)
Dept: FAMILY MEDICINE CLINIC | Age: 49
End: 2019-04-16

## 2019-04-16 DIAGNOSIS — E11.8 TYPE 2 DIABETES MELLITUS WITH COMPLICATION, WITHOUT LONG-TERM CURRENT USE OF INSULIN (HCC): ICD-10-CM

## 2019-04-16 RX ORDER — INSULIN PUMP SYRINGE, 3 ML
EACH MISCELLANEOUS
Qty: 1 KIT | Refills: 0 | Status: SHIPPED | OUTPATIENT
Start: 2019-04-16

## 2019-04-16 RX ORDER — LANCETS
EACH MISCELLANEOUS
Qty: 100 EACH | Refills: 11 | Status: SHIPPED | OUTPATIENT
Start: 2019-04-16

## 2019-04-16 NOTE — PROGRESS NOTES
Pt presents for initial diabetes education visit; after being out of care for 2 years (no insurance) and re establishing care with Pietro Travis - she referred him for diabetes education. His initial visit back with her was in March 2019 and he ended up having osteomyelitis and a toe amputated on 4/1/19. He has follow up for this. Also had elevated Calcium; being referred to endocrine. A1c of 12.4% off of all meds; fructosamine level indicates improved when checked Friday. Medications reviewed and reconciled. Started amlodipine Saturday;  BP was 168/96 Friday PCP visit     Still smoking due to all of the stress going on; his sister has breast cancer, Mom sick with CHF. Smoking one pack per day. Tried chantix but didn't continue on it; has it at home. Discussed cutting back; getting back on chantix when he feels like he can set a quite date. Contemplation phase. Discussed need to try to stop to help with circulation for healing and given DM / HTN. Has had diabetes for around 8 years. Looked back and saw A1c values back in that time frame. Has been adherent to medications. Routine / Diet:  5:30 AM up 2 sons and grandson; wife who works  Drinks coffee - no cream, splenda   [place], eggs and cheese, carrillo - no toast usually, one piece of sausage  May snack - rice cakes   Unsweetened apple sauce   Sleeping more than usual with being sick   Dinner eat out a lot - yesterday had meatballs, corn, string beans, pasta   Discussed portion control and choices   Diet sodas, but has cut back to one a day (was drinking a sick pack a day)  Zero calorie tea, or adds splenda   Sugar free cookies, but realized they had carbs      today in clinic,  6 hours after brunch (eggs, carrillo, biscuit, cheese)   Not checking BG at home; needs new supplies. Orders sent to pharmacy. Pt will call if any issues getting the supplies.      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[employer], electrical work - [place] like his activity level increasing and routine with eating will help with BG also     Education Today  -Nutrition: utilized \"Planning Healthy Meals\" Education Materials to review healthy plate, Carbohydrate and Non-Carbohydrate foods, Carbohydrate content for foods / serving sizes / 15 grams = 1 Carb serving, reading the nutrition label / focusing on total carbs and serving size, portion sizes / portion control of carbs, healthy snacks that are low carb, discussion of fruits / portion sizes to target  -SMBG: monitoring blood glucose and using log; check fasting BG for now  -Goals: SMBGs, A1c - education hand outs provided   -Signs and Symptoms of Hyperglycemia and Hypoglycemia and what to do - education hand outs provided  -Exercise: positive impact on blood sugar control  -Complications Avoidance - controlling blood glucose to prevent complications  -Monitoring recommended including eye exam, foot exam, microalbumin, kindey function, blood pressure, cholesterol, vaccines    Recommended eye exam    BP still elevated, down slightly vs. Friday visit; only has been on amlodipine for 3 days; not able to take HCTZ due to elevated Calcium. Plans to buy BP cuff to use at home and write down BPs - will stay on same regimen and call if notices BP elevated once starts checking at home. BG today improved vs. Last A1c. Will c/w current medications. Follow up with PCP as planned; and with me as desired. Given my contact information. He declines a follow up visit in person with me before PCP visit, but will call if issues. Patient verbalized understanding of information presented. Answered all of the patient's questions.       Joanie Coy, PHARMD], CDE

## 2019-06-13 ENCOUNTER — OFFICE VISIT (OUTPATIENT)
Dept: FAMILY MEDICINE CLINIC | Age: 49
End: 2019-06-13

## 2019-06-13 VITALS
RESPIRATION RATE: 18 BRPM | TEMPERATURE: 96.3 F | SYSTOLIC BLOOD PRESSURE: 160 MMHG | HEART RATE: 72 BPM | OXYGEN SATURATION: 99 % | DIASTOLIC BLOOD PRESSURE: 106 MMHG

## 2019-06-13 DIAGNOSIS — N52.9 ERECTILE DYSFUNCTION, UNSPECIFIED ERECTILE DYSFUNCTION TYPE: ICD-10-CM

## 2019-06-13 DIAGNOSIS — R19.6 HALITOSIS: ICD-10-CM

## 2019-06-13 DIAGNOSIS — E78.00 HYPERCHOLESTEROLEMIA: ICD-10-CM

## 2019-06-13 DIAGNOSIS — E83.52 HYPERCALCEMIA: ICD-10-CM

## 2019-06-13 DIAGNOSIS — I10 ESSENTIAL HYPERTENSION: ICD-10-CM

## 2019-06-13 DIAGNOSIS — Z89.422 S/P AMPUTATION OF LESSER TOE, LEFT (HCC): ICD-10-CM

## 2019-06-13 DIAGNOSIS — E11.8 TYPE 2 DIABETES MELLITUS WITH COMPLICATION, WITHOUT LONG-TERM CURRENT USE OF INSULIN (HCC): Primary | ICD-10-CM

## 2019-06-13 DIAGNOSIS — G45.9 TIA (TRANSIENT ISCHEMIC ATTACK): ICD-10-CM

## 2019-06-13 DIAGNOSIS — R11.0 NAUSEA: ICD-10-CM

## 2019-06-13 PROBLEM — E11.40 TYPE 2 DIABETES MELLITUS WITH DIABETIC NEUROPATHY (HCC): Status: ACTIVE | Noted: 2019-06-13

## 2019-06-13 LAB — HBA1C MFR BLD HPLC: 7.3 %

## 2019-06-13 RX ORDER — HYDRALAZINE HYDROCHLORIDE 25 MG/1
25 TABLET, FILM COATED ORAL 3 TIMES DAILY
Qty: 90 TAB | Refills: 5 | Status: SHIPPED | OUTPATIENT
Start: 2019-06-13 | End: 2019-12-22

## 2019-06-13 RX ORDER — ATORVASTATIN CALCIUM 40 MG/1
TABLET, FILM COATED ORAL
COMMUNITY
Start: 2019-05-16 | End: 2019-07-19 | Stop reason: SDUPTHER

## 2019-06-13 RX ORDER — PHENOL/SODIUM PHENOLATE
20 AEROSOL, SPRAY (ML) MUCOUS MEMBRANE DAILY
Qty: 30 TAB | Refills: 2 | Status: SHIPPED | OUTPATIENT
Start: 2019-06-13 | End: 2019-11-13 | Stop reason: ALTCHOICE

## 2019-06-13 RX ORDER — SILDENAFIL 100 MG/1
TABLET, FILM COATED ORAL
Qty: 30 TAB | Refills: 1 | Status: SHIPPED | OUTPATIENT
Start: 2019-06-13 | End: 2021-02-26 | Stop reason: SDUPTHER

## 2019-06-13 RX ORDER — ASPIRIN 81 MG/1
81 TABLET ORAL DAILY
Refills: 0 | COMMUNITY
Start: 2019-04-21

## 2019-06-13 NOTE — PROGRESS NOTES
Chief Complaint   Patient presents with    Diabetes    Hypertension     Pt states has not taken Tradjenta in 3 days due to nausea. Pt concerned with possibly infected tonsils. Pt states no pain but left side is swollen and stated had a bad smell. Pt states has a concern that when he burps he has extremely bad smell. 1. Have you been to the ER, urgent care clinic since your last visit? Hospitalized since your last visit? Yes, 4/21/209 for TIA. Pt went to [facility]. Pt did not go neurology. 2. Have you seen or consulted any other health care providers outside of the [address] [place] [address] since your last visit? Include any pap smears or colon screening.  No    Health Maintenance Due   Topic Date Due    Pneumococcal 0-64 years (1 of 1 - PPSV23) 07/10/1974    EYE EXAM RETINAL OR DILATED  07/10/1978    FOOT EXAM Q1  05/24/2018    Shingrix Vaccine Age 50> (1 of 2) 07/10/2018    FOBT Q 1 YEAR AGE 50-75  07/10/2018

## 2019-06-13 NOTE — PROGRESS NOTES
HISTORY OF PRESENT ILLNESS  Ji Castro is a 46 y.o. male. HPI  Patient comes in today for follow up diabetes  Pt concerned with possibly infected tonsils. Pt states no pain but left side is swollen and stated had a bad smell. Pt states has a concern that when he burps he has extremely bad smell. states he has had in past, may would last for 1-2 days, then resolves. He he had for 3-4 days ago. Had some nausea with Tradjenta. BMs normal.  No blood, no melena. Not sure if he has heartburn or not. Was passing excessive amounts of gas as well. 4/1/19 - left 4th toe amputation and partial metatarsal with insertion of antibiotic beads. Saw podiatry yesterday, wound healing well. Will have sutures removed next week. BP has been high, states he has not been able to do much walking or bike riding with recent toe amputation. Taking lisinopril. Tries to follow low sodium diet. Has not been checking BG regularly. Has only checked 3 times since his last visit. States he was getting fasting readings 90s. Has not been eating too much due to antibiotics, causes nausea. Taking metformin, glipizide, and tradjenta. Patient was in [facility] 4/21/19 for TIA. Was seen by neurology. Was to follow up on 6/2/19. Has been taking lisinopril and amlodipine.   States BPs have been high  Allergies   Allergen Reactions    Januvia [Sitagliptin] Diarrhea and Nausea and Vomiting       Past Medical History:   Diagnosis Date    DM (diabetes mellitus) ([address] 6/1/2010    HTN (hypertension) 6/1/2010    Hypercalcemia 3/13/2019    Hypertension     Osteomyelitis of left foot ([address] 3/13/2019    S/P amputation of lesser toe, left ([address] 4/12/2019    TIA (transient ischemic attack) 04/21/2019    Tobacco abuse 3/13/2019    Vitamin D deficiency 8/7/2017       Past Surgical History:   Procedure Laterality Date    HX ORTHOPAEDIC      right arm surgery as a child    HX OTHER SURGICAL      scrotal abscess drainage       Social History Socioeconomic History    Marital status:      Spouse name: Not on file    Number of children: Not on file    Years of education: Not on file    Highest education level: Not on file   Occupational History    Not on file   Social Needs    Financial resource strain: Not on file    Food insecurity:     Worry: Not on file     Inability: Not on file    Transportation needs:     Medical: Not on file     Non-medical: Not on file   Tobacco Use    Smoking status: Current Every Day Smoker     Packs/day: 1.00    Smokeless tobacco: Never Used   Substance and Sexual Activity    Alcohol use: Yes     Comment: occ    Drug use: No    Sexual activity: Not on file   Lifestyle    Physical activity:     Days per week: Not on file     Minutes per session: Not on file    Stress: Not on file   Relationships    Social connections:     Talks on phone: Not on file     Gets together: Not on file     Attends Anabaptism service: Not on file     Active member of club or organization: Not on file     Attends meetings of clubs or organizations: Not on file     Relationship status: Not on file    Intimate partner violence:     Fear of current or ex partner: Not on file     Emotionally abused: Not on file     Physically abused: Not on file     Forced sexual activity: Not on file   Other Topics Concern    Not on file   Social History Narrative    Not on file       Family History   Problem Relation Age of Onset    Diabetes Mother     Hypertension Mother     Heart Failure Mother     Cancer Father     Diabetes Sister          at age 28y from MI, stroke, kidney failure    Hypertension Sister     Kidney Disease Sister     Stroke Sister     Heart Attack Sister     Breast Cancer Sister        Current Outpatient Medications   Medication Sig    aspirin delayed-release 81 mg tablet     atorvastatin (LIPITOR) 40 mg tablet     lancets misc MONITOR BG TWICE DAILY  DX E11.8  TYPE 2 DM    glucose blood VI test strips (BLOOD GLUCOSE TEST) strip MONITOR BG TWICE DAILY  DX E11.8  TYPE 2 DM    Blood-Glucose Meter monitoring kit MONITOR BG TWICE DAILY  DX E11.8  TYPE 2 DM    ergocalciferol (ERGOCALCIFEROL) 50,000 unit capsule Take 1 Cap by mouth every seven (7) days.  amLODIPine (NORVASC) 10 mg tablet Take 1 Tab by mouth daily.  lisinopril (PRINIVIL, ZESTRIL) 40 mg tablet TAKE ONE TABLET BY MOUTH ONCE DAILY    linagliptin (TRADJENTA) 5 mg tablet TAKE ONE TABLET BY MOUTH ONCE DAILY    glipiZIDE (GLUCOTROL) 10 mg tablet TAKE ONE TABLET BY MOUTH TWICE DAILY WITH MEALS    metFORMIN ER (GLUCOPHAGE XR) 500 mg tablet TAKE TWO TABLETS WITH BREAKFAST AND TWO TABLETS WITH  DINNER    gabapentin (NEURONTIN) 300 mg capsule TAKE ONE CAPSULE BY MOUTH THREE TIMES DAILY (Patient taking differently: TAKE ONE CAPSULE BY MOUTH THREE TIMES DAILY AS NEEDED)    pravastatin (PRAVACHOL) 40 mg tablet TAKE 1 TABLET BY MOUTH NIGHTLY (Patient not taking: Reported on 6/13/2019)    varenicline (CHANTIX STARTER ISHMAEL) 0.5 mg (11)- 1 mg (42) DsPk Per dose pack instructions (Patient not taking: Reported on 4/12/2019)     No current facility-administered medications for this visit. Review of Systems   Constitutional: Negative for chills, fever and malaise/fatigue. Respiratory: Negative for cough and shortness of breath. Cardiovascular: Negative for chest pain, palpitations and leg swelling. Gastrointestinal: Negative for abdominal pain, nausea and vomiting. Genitourinary: Negative for dysuria, frequency and urgency. Musculoskeletal: Negative for myalgias. Skin: Negative. Neurological: Negative for dizziness, tingling (states burning sensation in feet has resolved) and headaches.      Vitals:    06/13/19 0846 06/13/19 1007   BP: (!) 184/115 (!) 160/106   Pulse: 72    Resp: 18    Temp: 96.3 °F (35.7 °C)    TempSrc: Oral    SpO2: 99%    Weight: (P) 206 lb 9.6 oz (93.7 kg)    Height: (P) 6' 3\" (1.905 m)      Physical Exam   Constitutional: He is oriented to person, place, and time. He appears well-developed and well-nourished. BP elevated today   HENT:   Right Ear: Hearing, tympanic membrane, external ear and ear canal normal.   Left Ear: Hearing, tympanic membrane, external ear and ear canal normal.   Nose: Nose normal.   Mouth/Throat: Oropharyngeal exudate present. No posterior oropharyngeal edema, posterior oropharyngeal erythema or tonsillar abscesses. Cardiovascular: Normal rate, regular rhythm and normal heart sounds. Pulmonary/Chest: Effort normal and breath sounds normal.   Neurological: He is alert and oriented to person, place, and time. Skin: Skin is warm and dry. Psychiatric: His behavior is normal. Thought content normal.   Vitals reviewed. ASSESSMENT and PLAN    ICD-10-CM ICD-9-CM    1. Type 2 diabetes mellitus with complication, without long-term current use of insulin ([facility]) E11.8 250.90 AMB POC HEMOGLOBIN E7K      METABOLIC PANEL, BASIC      semaglutide (OZEMPIC) 0.25 mg/0.2 mL (2 mg/1.5 mL) sub-q pen   2. Essential hypertension I10 401.9 hydrALAZINE (APRESOLINE) 25 mg tablet   3. TIA (transient ischemic attack) G45.9 435.9 REFERRAL TO NEUROLOGY   4. Hypercalcemia E83.52 275.42    5. Hypercholesterolemia E78.00 272.0    6. Halitosis R19.6 784.99 Omeprazole delayed release (PRILOSEC D/R) 20 mg tablet      UPPER RESPIRATORY CULTURE   7. Nausea R11.0 787.02 Omeprazole delayed release (PRILOSEC D/R) 20 mg tablet      UPPER RESPIRATORY CULTURE   8. Erectile dysfunction, unspecified erectile dysfunction type N52.9 607.84 sildenafil citrate (VIAGRA) 100 mg tablet   9. S/P amputation of lesser toe, left [facility]) Q57.054 V49.72      Encounter Diagnoses   Name Primary?     Type 2 diabetes mellitus with complication, without long-term current use of insulin ([facility]) Yes    Essential hypertension     TIA (transient ischemic attack)     Hypercalcemia     Hypercholesterolemia     Halitosis     Nausea     Erectile dysfunction, unspecified erectile dysfunction type     S/P amputation of lesser toe, left ([address]      Orders Placed This Encounter    UPPER RESPIRATORY CULTURE    METABOLIC PANEL, BASIC   [facility] ref [id number]    AMB POC HEMOGLOBIN A1C    aspirin delayed-release 81 mg tablet    atorvastatin (LIPITOR) 40 mg tablet    hydrALAZINE (APRESOLINE) 25 mg tablet    semaglutide (OZEMPIC) 0.25 mg/0.2 mL (2 mg/1.5 mL) sub-q pen    Omeprazole delayed release (PRILOSEC D/R) 20 mg tablet    sildenafil citrate (VIAGRA) 100 mg tablet     Diagnoses and all orders for this visit:    1. Type 2 diabetes mellitus with complication, without long-term current use of insulin ([facility])  -     AMB POC HEMOGLOBIN G2C  -     METABOLIC PANEL, BASIC  -     semaglutide (OZEMPIC) 0.25 mg/0.2 mL (2 mg/1.5 mL) sub-q pen; 0.25 mg by SubCUTAneous route every seven (7) days. 2. Essential hypertension - not at goal.  Add hydralazine  -     hydrALAZINE (APRESOLINE) 25 mg tablet; Take 1 Tab by mouth three (3) times daily. 3. TIA (transient ischemic attack)  -     REFERRAL TO NEUROLOGY    4. Hypercalcemia    5. Hypercholesterolemia    6. Halitosis  -     Omeprazole delayed release (PRILOSEC D/R) 20 mg tablet; Take 1 Tab by mouth daily. -     UPPER RESPIRATORY CULTURE    7. Nausea  -     Omeprazole delayed release (PRILOSEC D/R) 20 mg tablet; Take 1 Tab by mouth daily. -     UPPER RESPIRATORY CULTURE    8. Erectile dysfunction, unspecified erectile dysfunction type  -     sildenafil citrate (VIAGRA) 100 mg tablet; Take 1/2 to 1 tab by mouth 30-60 minutes prior to sex on empty stomach. 9. S/P amputation of lesser toe, left ([address]      Follow-up and Dispositions    · Return in about 3 months (around 9/13/2019). lab results and schedule of future lab studies reviewed with patient  reviewed diet, exercise and weight control    I have reviewed the patient's allergies and made any necessary changes.  Medical, procedural, social and family histories have been reviewed and updated as medically indicated. I have reconciled and/or revised patient medications in the EMR. I have discussed each diagnosis listed in this note with Skye Ferreira and/or their family. I have discussed treatment options and the risk/benefit analysis of those options, including safe use of medications and possible medication side effects. Through the use of shared decision making we have agreed to the above plan. The patient has received an after-visit summary and questions were answered concerning future plans. Joslyn Burgess, LUPEP-C    This note will not be viewable in [employer]. Total visit time spent in direct patient care/contact:  25-39 minutes  Greater than 50% of visit time was spent counseling and coordinating care.

## 2019-06-14 LAB
BUN SERPL-MCNC: 9 MG/DL (ref 6–24)
BUN/CREAT SERPL: 11 (ref 9–20)
CALCIUM SERPL-MCNC: 11.2 MG/DL (ref 8.7–10.2)
CHLORIDE SERPL-SCNC: 104 MMOL/L (ref 96–106)
CO2 SERPL-SCNC: 25 MMOL/L (ref 20–29)
CREAT SERPL-MCNC: 0.85 MG/DL (ref 0.76–1.27)
GLUCOSE SERPL-MCNC: 75 MG/DL (ref 65–99)
POTASSIUM SERPL-SCNC: 4.5 MMOL/L (ref 3.5–5.2)
SODIUM SERPL-SCNC: 142 MMOL/L (ref 134–144)

## 2019-06-15 LAB
BACTERIA SPEC RESP CULT: ABNORMAL
BACTERIA SPEC RESP CULT: ABNORMAL

## 2019-06-17 DIAGNOSIS — B95.1 PHARYNGITIS DUE TO GROUP B BETA HEMOLYTIC STREPTOCOCCI: Primary | ICD-10-CM

## 2019-06-17 DIAGNOSIS — J02.0 PHARYNGITIS DUE TO GROUP B BETA HEMOLYTIC STREPTOCOCCI: Primary | ICD-10-CM

## 2019-06-17 RX ORDER — AMOXICILLIN 500 MG/1
500 CAPSULE ORAL 3 TIMES DAILY
Qty: 30 CAP | Refills: 0 | Status: SHIPPED | OUTPATIENT
Start: 2019-06-17 | End: 2019-06-27

## 2019-07-19 RX ORDER — ATORVASTATIN CALCIUM 40 MG/1
40 TABLET, FILM COATED ORAL DAILY
Qty: 90 TAB | Refills: 3 | Status: SHIPPED | OUTPATIENT
Start: 2019-07-19 | End: 2020-07-07 | Stop reason: SDUPTHER

## 2019-08-30 ENCOUNTER — OFFICE VISIT (OUTPATIENT)
Dept: ENDOCRINOLOGY | Age: 49
End: 2019-08-30

## 2019-08-30 VITALS
BODY MASS INDEX: 24.84 KG/M2 | HEIGHT: 75 IN | WEIGHT: 199.8 LBS | RESPIRATION RATE: 18 BRPM | HEART RATE: 79 BPM | SYSTOLIC BLOOD PRESSURE: 138 MMHG | DIASTOLIC BLOOD PRESSURE: 79 MMHG

## 2019-08-30 DIAGNOSIS — E83.52 HYPERCALCEMIA: ICD-10-CM

## 2019-08-30 DIAGNOSIS — E11.40 TYPE 2 DIABETES MELLITUS WITH DIABETIC NEUROPATHY, WITHOUT LONG-TERM CURRENT USE OF INSULIN (HCC): Primary | ICD-10-CM

## 2019-08-30 NOTE — PROGRESS NOTES
Chief Complaint   Patient presents with    Diabetes     PCP/pharmacy verified     Pt presents in stable condition, without complaints. 3 most recent PHQ Screens 8/30/2019   Little interest or pleasure in doing things Not at all   Feeling down, depressed, irritable, or hopeless Not at all   Total Score PHQ 2 0     1. Have you been to the ER, urgent care clinic since your last visit? Hospitalized since your last visit? No    2. Have you seen or consulted any other health care providers outside of the [address] [place] [address] since your last visit? Include any pap smears or colon screening.  No

## 2019-08-30 NOTE — PROGRESS NOTES
This is a 20-year-old -American male with a history of type 2 diabetes mellitus and unexplained hypercalcemia. Regarding his diabetes, he has been on a number of medications on and off for his diabetes. Because of insurance issues he was off all medication for 2 years and presented to [facility] with a perineal abscess. He was found to have markedly elevated blood sugars and was started back on medication. Also as a result of his hyperglycemia, he developed osteomyelitis of the left foot and underwent an amputation. He had a TIA after that. Recently he has been placed on Ozempic 0.25 mg along with Tradjenta, glipizide, and metformin. He had significant nausea and gastric paretic symptoms with the Ozempic so he stopped it several weeks ago. Currently he takes Tradjenta 5 mg, glipizide 10 twice daily, and metformin at thousand milligrams twice daily. His hemoglobin A1c improved dramatically with the addition of medication. Today, in our office, fasting, his blood sugar 72 not on Ozempic but on the other medications. He tells us that he did lose considerable weight after the diagnosis of diabetes. He went from 265 pounds to 230 pounds. He is lost an additional 15 pounds since April. He eats breakfast or lunch but not both. Breakfast can be eggs sausage and grits occasionally will have a sandwich or salad for lunch. He eats a low-carb dinner. Last night he had ribs, chicken and one roll. Regarding the hypercalcemia, there is a strong family history of thyroid/parathyroid surgery. He said his mother had her thyroid removed and he believes her parathyroid and had an implantation of one gland into her arm. His sister had multiple issues related to her thyroid as well as diabetes hypertension and end-stage renal disease. She apparently  of a heart attack/stroke.   Patient is not sure whether there is a history of parathyroid disease or not other than the fact that there was a history of thyroid surgery for his mother and his sister. Patient has minimal symptoms suggestive of hypercalcemia. Notably he denies constipation. There is the history of weight loss. He has had a history of low vitamin D level (13.1) and recently completed a 12-week course of 50,000 units of vitamin D weekly. PTH levels have been low (13). Examination  Blood pressure 138/79  Pulse 79  Weight 199    Impression  1. Type 2 diabetes mellitus now well controlled on glipizide Tradjenta and Metformin. Patient has discontinued Ozempic after a short trial.  He was clearly intolerant of the medication. It should be noted that the combination of Tradjenta and Ozempic is redundant  2. Regarding the hypercalcemia, he has had low PTH levels with a calcium of 11.6 and low vitamin D levels. I repeated all of these test today and also obtained a PTH RP. This is particularly relevant with the weight loss. It should be noted that the patient has been doing some independent research on that and he too is worried about malignancy. Given the family history of thyroid surgery and the lack of symptoms of hypercalcemia,, it is possible that his calcium elevations are related to impaired excretion of calcium rather than increased bone degradation. We will see him back in 1 month and review all of his laboratory tests. ADDENDUM. Labs not run.  Resent to Sequence

## 2019-09-03 LAB
25(OH)D3+25(OH)D2 SERPL-MCNC: 29.9 NG/ML (ref 30–100)
CALCIUM SERPL-MCNC: 11.5 MG/DL (ref 8.7–10.2)
PHOSPHATE SERPL-MCNC: 3.3 MG/DL (ref 2.5–4.5)
PTH RELATED PROT SERPL-SCNC: NORMAL PMOL/L
PTH-INTACT SERPL-MCNC: NORMAL PG/ML

## 2019-09-04 ENCOUNTER — TELEPHONE (OUTPATIENT)
Dept: ENDOCRINOLOGY | Age: 49
End: 2019-09-04

## 2019-09-04 NOTE — TELEPHONE ENCOUNTER
Spoke with Johanna Crowder from [facility] about  Sabinofelice gomez and she stated that there was no plasma for the PTH Intact and no frozen plasma for the PTH Related Peptide

## 2019-09-13 ENCOUNTER — OFFICE VISIT (OUTPATIENT)
Dept: FAMILY MEDICINE CLINIC | Age: 49
End: 2019-09-13

## 2019-09-13 VITALS
HEART RATE: 84 BPM | BODY MASS INDEX: 24.82 KG/M2 | HEIGHT: 75 IN | SYSTOLIC BLOOD PRESSURE: 148 MMHG | DIASTOLIC BLOOD PRESSURE: 88 MMHG | RESPIRATION RATE: 20 BRPM | TEMPERATURE: 97 F | OXYGEN SATURATION: 100 % | WEIGHT: 199.6 LBS

## 2019-09-13 DIAGNOSIS — E11.8 TYPE 2 DIABETES MELLITUS WITH COMPLICATION, WITHOUT LONG-TERM CURRENT USE OF INSULIN (HCC): Primary | ICD-10-CM

## 2019-09-13 DIAGNOSIS — E78.00 HYPERCHOLESTEROLEMIA: ICD-10-CM

## 2019-09-13 DIAGNOSIS — E83.52 HYPERCALCEMIA: ICD-10-CM

## 2019-09-13 DIAGNOSIS — I10 ESSENTIAL HYPERTENSION: ICD-10-CM

## 2019-09-13 LAB — HBA1C MFR BLD HPLC: 5.8 %

## 2019-09-13 NOTE — PROGRESS NOTES
Name and  verified      Chief Complaint   Patient presents with    Hypertension     Follow Up       Health Maintenance reviewed-discussed with patient. 1. Have you been to the ER, urgent care clinic since your last visit? Hospitalized since your last visit? Yes, Endocrinologist two weeks ago patient reports. 2. Have you seen or consulted any other health care providers outside of the [address] [place] [address] since your last visit? Include any pap smears or colon screening.  no

## 2019-09-13 NOTE — PROGRESS NOTES
HISTORY OF PRESENT ILLNESS  Alecia White is a 44 y.o. male. HPI  Patient comes in today for follow up  Saw Dr. Jesus Claros for elevated calcium levels. Stopped Ozempic, states the medication made him sick. He took it for a month. Within 24h of taking, had N/V/D for 2-3 days. States his BG may be up because he has not been eating right. He has been eating breads. Took BP meds just before visit today. Had appointment with neurologist.  Was not able to go because his mother had emergency. Rescheduled appt in November 2019. Has some bumps under left arm, just noticed them this week.   Allergies   Allergen Reactions    Januvia [Sitagliptin] Diarrhea and Nausea and Vomiting       Past Medical History:   Diagnosis Date    DM (diabetes mellitus) ([facility] 6/1/2010    HTN (hypertension) 6/1/2010    Hypercalcemia 3/13/2019    Hypertension     Osteomyelitis of left foot ([facility] 3/13/2019    S/P amputation of lesser toe, left ([facility] 4/12/2019    TIA (transient ischemic attack) 04/21/2019    Tobacco abuse 3/13/2019    Vitamin D deficiency 8/7/2017       Past Surgical History:   Procedure Laterality Date    HX ORTHOPAEDIC      right arm surgery as a child    HX OTHER SURGICAL      scrotal abscess drainage       Social History     Socioeconomic History    Marital status:      Spouse name: Not on file    Number of children: Not on file    Years of education: Not on file    Highest education level: Not on file   Occupational History    Not on file   Social Needs    Financial resource strain: Not on file    Food insecurity:     Worry: Not on file     Inability: Not on file    Transportation needs:     Medical: Not on file     Non-medical: Not on file   Tobacco Use    Smoking status: Current Every Day Smoker     Packs/day: 1.00    Smokeless tobacco: Never Used   Substance and Sexual Activity    Alcohol use: Yes     Comment: occ    Drug use: No    Sexual activity: Yes     Partners: Female   Lifestyle  Physical activity:     Days per week: Not on file     Minutes per session: Not on file    Stress: Not on file   Relationships    Social connections:     Talks on phone: Not on file     Gets together: Not on file     Attends Alevism service: Not on file     Active member of club or organization: Not on file     Attends meetings of clubs or organizations: Not on file     Relationship status: Not on file    Intimate partner violence:     Fear of current or ex partner: Not on file     Emotionally abused: Not on file     Physically abused: Not on file     Forced sexual activity: Not on file   Other Topics Concern    Not on file   Social History Narrative    Not on file       Family History   Problem Relation Age of Onset    Diabetes Mother     Hypertension Mother     Heart Failure Mother     Cancer Father     Diabetes Sister          at age 28y from MI, stroke, kidney failure    Hypertension Sister     Kidney Disease Sister     Stroke Sister     Heart Attack Sister     Breast Cancer Sister        Current Outpatient Medications   Medication Sig    atorvastatin (LIPITOR) 40 mg tablet Take 1 Tab by mouth daily.  aspirin delayed-release 81 mg tablet Take 81 mg by mouth daily.  hydrALAZINE (APRESOLINE) 25 mg tablet Take 1 Tab by mouth three (3) times daily.  Omeprazole delayed release (PRILOSEC D/R) 20 mg tablet Take 1 Tab by mouth daily. (Patient taking differently: Take 20 mg by mouth daily as needed.)    sildenafil citrate (VIAGRA) 100 mg tablet Take 1/2 to 1 tab by mouth 30-60 minutes prior to sex on empty stomach.  lancets misc MONITOR BG TWICE DAILY  DX E11.8  TYPE 2 DM    glucose blood VI test strips (BLOOD GLUCOSE TEST) strip MONITOR BG TWICE DAILY  DX E11.8  TYPE 2 DM    Blood-Glucose Meter monitoring kit MONITOR BG TWICE DAILY  DX E11.8  TYPE 2 DM    amLODIPine (NORVASC) 10 mg tablet Take 1 Tab by mouth daily.     lisinopril (PRINIVIL, ZESTRIL) 40 mg tablet TAKE ONE TABLET BY MOUTH ONCE DAILY    linagliptin (TRADJENTA) 5 mg tablet TAKE ONE TABLET BY MOUTH ONCE DAILY    glipiZIDE (GLUCOTROL) 10 mg tablet TAKE ONE TABLET BY MOUTH TWICE DAILY WITH MEALS    metFORMIN ER (GLUCOPHAGE XR) 500 mg tablet TAKE TWO TABLETS WITH BREAKFAST AND TWO TABLETS WITH  DINNER    semaglutide (OZEMPIC) 0.25 mg/0.2 mL (2 mg/1.5 mL) sub-q pen 0.25 mg by SubCUTAneous route every seven (7) days.  ergocalciferol (ERGOCALCIFEROL) 50,000 unit capsule Take 1 Cap by mouth every seven (7) days. (Patient not taking: Reported on 8/30/2019)     No current facility-administered medications for this visit. Review of Systems   Constitutional: Negative for chills, fever and malaise/fatigue. Respiratory: Negative for cough and shortness of breath. Cardiovascular: Negative for chest pain, palpitations and leg swelling. Gastrointestinal: Negative for abdominal pain, nausea and vomiting. Genitourinary: Negative for dysuria, frequency and urgency. Musculoskeletal: Negative for myalgias. Skin: Negative. Neurological: Negative for dizziness and headaches. Vitals:    09/13/19 0739 09/13/19 0818   BP: 156/86 148/88   Pulse: 84    Resp: 20    Temp: 97 °F (36.1 °C)    TempSrc: Oral    SpO2: 100%    Weight: 199 lb 9.6 oz (90.5 kg)    Height: 6' 3\" (1.905 m)      Physical Exam   Constitutional: He is oriented to person, place, and time. He appears well-developed and well-nourished. BP elevated today   HENT:   Right Ear: Hearing, tympanic membrane, external ear and ear canal normal.   Left Ear: Hearing, tympanic membrane, external ear and ear canal normal.   Nose: Nose normal.   Mouth/Throat: No oropharyngeal exudate, posterior oropharyngeal edema, posterior oropharyngeal erythema or tonsillar abscesses. Cardiovascular: Normal rate, regular rhythm and normal heart sounds. Pulmonary/Chest: Effort normal and breath sounds normal.   Neurological: He is alert and oriented to person, place, and time. Skin: Skin is warm and dry. Lesion (3 small cystic lesions left axilla c/w sebaceous cysts. if do not resolve in 1 month, will obtain US) noted. Psychiatric: His behavior is normal. Thought content normal.   Vitals reviewed. ASSESSMENT and PLAN    ICD-10-CM ICD-9-CM    1. Type 2 diabetes mellitus with complication, without long-term current use of insulin ([facility]) E11.8 250.90 AMB POC HEMOGLOBIN A1C   2. Essential hypertension I10 401.9    3. Hypercalcemia E83.52 275.42    4. Hypercholesterolemia E78.00 272.0      Encounter Diagnoses   Name Primary?  Type 2 diabetes mellitus with complication, without long-term current use of insulin ([facility]) Yes    Essential hypertension     Hypercalcemia     Hypercholesterolemia      Orders Placed This Encounter    AMB POC HEMOGLOBIN A1C     Diagnoses and all orders for this visit:    1. Type 2 diabetes mellitus with complication, without long-term current use of insulin ([facility])  -     AMB POC HEMOGLOBIN A1C    2. Essential hypertension - not at goal but patient took medication just before visit today. Patient to monitor BP at home for 2-3 weeks, send readings. May need adjustment in BP meds    3. Hypercalcemia - patient to have labs ordered by endocrinology,follow up with them    4. Hypercholesterolemia - on atorvastatin, not fasting      Follow-up and Dispositions    · Return in about 3 months (around 12/13/2019), or if symptoms worsen or fail to improve.       lab results and schedule of future lab studies reviewed with patient    I have reviewed the patient's allergies and made any necessary changes. Medical, procedural, social and family histories have been reviewed and updated as medically indicated. I have reconciled and/or revised patient medications in the EMR. I have discussed each diagnosis listed in this note with Nika Garrison and/or their family.  I have discussed treatment options and the risk/benefit analysis of those options, including safe use of medications and possible medication side effects. Through the use of shared decision making we have agreed to the above plan. The patient has received an after-visit summary and questions were answered concerning future plans. JENNIFER Casey    This note will not be viewable in "[employer].

## 2019-09-14 RX ORDER — LISINOPRIL 40 MG/1
TABLET ORAL
Qty: 90 TAB | Refills: 3 | Status: SHIPPED | OUTPATIENT
Start: 2019-09-14 | End: 2020-09-10

## 2019-09-14 RX ORDER — AMLODIPINE BESYLATE 10 MG/1
10 TABLET ORAL DAILY
Qty: 90 TAB | Refills: 3 | Status: SHIPPED | OUTPATIENT
Start: 2019-09-14 | End: 2020-09-10

## 2019-09-14 RX ORDER — GLIPIZIDE 10 MG/1
TABLET ORAL
Qty: 180 TAB | Refills: 3 | Status: SHIPPED | OUTPATIENT
Start: 2019-09-14 | End: 2020-09-10

## 2019-09-14 RX ORDER — METFORMIN HYDROCHLORIDE 500 MG/1
TABLET, EXTENDED RELEASE ORAL
Qty: 360 TAB | Refills: 3 | Status: SHIPPED | OUTPATIENT
Start: 2019-09-14 | End: 2020-07-07

## 2019-09-16 DIAGNOSIS — Z72.0 TOBACCO ABUSE: Primary | ICD-10-CM

## 2019-09-16 RX ORDER — VARENICLINE TARTRATE 25 MG
KIT ORAL
Qty: 1 DOSE PACK | Refills: 0 | Status: SHIPPED | OUTPATIENT
Start: 2019-09-16 | End: 2019-10-16

## 2019-09-16 RX ORDER — VARENICLINE TARTRATE 1 MG/1
1 TABLET, FILM COATED ORAL 2 TIMES DAILY
Qty: 60 TAB | Refills: 4 | Status: SHIPPED | OUTPATIENT
Start: 2019-09-16 | End: 2019-11-13 | Stop reason: ALTCHOICE

## 2019-09-16 NOTE — TELEPHONE ENCOUNTER
Prescription sent to pharmacy    Orders Placed This Encounter    varenicline (CHANTIX STARTER ISHMAEL) 0.5 mg (11)- 1 mg (42) DsPk     Sig: Per dose pack instructions     Dispense:  1 Dose Pack     Refill:  0    varenicline (CHANTIX) 1 mg tablet     Sig: Take 1 Tab by mouth two (2) times a day.  To be filled after completion of start pack     Dispense:  60 Tab     Refill:  4

## 2019-09-18 DIAGNOSIS — E83.52 HYPERCALCEMIA: Primary | ICD-10-CM

## 2019-09-18 LAB
PTH RELATED PROT SERPL-SCNC: <2 PMOL/L
PTH-INTACT SERPL-MCNC: 23 PG/ML (ref 15–65)

## 2019-11-13 ENCOUNTER — OFFICE VISIT (OUTPATIENT)
Dept: NEUROLOGY | Age: 49
End: 2019-11-13

## 2019-11-13 VITALS
OXYGEN SATURATION: 98 % | DIASTOLIC BLOOD PRESSURE: 88 MMHG | HEART RATE: 87 BPM | WEIGHT: 202 LBS | HEIGHT: 75 IN | SYSTOLIC BLOOD PRESSURE: 158 MMHG | BODY MASS INDEX: 25.12 KG/M2

## 2019-11-13 DIAGNOSIS — I63.9 CEREBROVASCULAR ACCIDENT (CVA), UNSPECIFIED MECHANISM (HCC): Primary | ICD-10-CM

## 2019-11-13 DIAGNOSIS — E11.9 CONTROLLED TYPE 2 DIABETES MELLITUS WITHOUT COMPLICATION, UNSPECIFIED WHETHER LONG TERM INSULIN USE (HCC): ICD-10-CM

## 2019-11-13 DIAGNOSIS — Z72.0 TOBACCO USE: ICD-10-CM

## 2019-11-13 DIAGNOSIS — E78.2 MIXED HYPERLIPIDEMIA: ICD-10-CM

## 2019-11-13 DIAGNOSIS — I10 ESSENTIAL HYPERTENSION: ICD-10-CM

## 2019-11-13 NOTE — PROGRESS NOTES
Neurology Note    Chief Complaint   Patient presents with    New Patient     stroke, 2 fingers numb, corner of mouth numbneess       HPI/Subjective  Flakita Sheth is a 44 y.o. male who presented to the neurology office for management of stroke. On April 21, 2019, patient woke up in the morning because of right facial numbness, right hand numbness and foot drop in his right leg. Patient did go to [facility] where MRI of the brain was done and he was found to have a left thalamic and posterior capsule infarct. Patient was started on aspirin 81 mg a day. Patient's LDL was elevated and he was started on Lipitor 40 mg daily. Other risk factors include hypertension, poorly controlled diabetes, hyperlipidemia and he continues to smoke 1 pack a day. Since the time of discharge, there has been no recurrence of strokelike symptoms. He does have residual numbness in his right hand and right side of the face. He still continues to smoke 1 pack a day. Current Outpatient Medications   Medication Sig    lisinopril (PRINIVIL, ZESTRIL) 40 mg tablet TAKE ONE TABLET BY MOUTH ONCE DAILY    amLODIPine (NORVASC) 10 mg tablet Take 1 Tab by mouth daily.  linaGLIPtin (TRADJENTA) 5 mg tablet TAKE ONE TABLET BY MOUTH ONCE DAILY    glipiZIDE (GLUCOTROL) 10 mg tablet TAKE ONE TABLET BY MOUTH TWICE DAILY WITH MEALS    metFORMIN ER (GLUCOPHAGE XR) 500 mg tablet TAKE TWO TABLETS WITH BREAKFAST AND TWO TABLETS WITH  DINNER    atorvastatin (LIPITOR) 40 mg tablet Take 1 Tab by mouth daily.  aspirin delayed-release 81 mg tablet Take 81 mg by mouth daily.  hydrALAZINE (APRESOLINE) 25 mg tablet Take 1 Tab by mouth three (3) times daily.  sildenafil citrate (VIAGRA) 100 mg tablet Take 1/2 to 1 tab by mouth 30-60 minutes prior to sex on empty stomach.     lancets misc MONITOR BG TWICE DAILY  DX E11.8  TYPE 2 DM    glucose blood VI test strips (BLOOD GLUCOSE TEST) strip MONITOR BG TWICE DAILY  DX E11.8  TYPE 2 DM    Blood-Glucose Meter monitoring kit MONITOR BG TWICE DAILY  DX E11.8  TYPE 2 DM     No current facility-administered medications for this visit. Allergies   Allergen Reactions    Januvia [Sitagliptin] Diarrhea and Nausea and Vomiting     Past Medical History:   Diagnosis Date    DM (diabetes mellitus) ([facility] 2010    HTN (hypertension) 2010    Hypercalcemia 3/13/2019    Hypertension     Osteomyelitis of left foot ([facility] 3/13/2019    S/P amputation of lesser toe, left ([facility] 2019    TIA (transient ischemic attack) 2019    Tobacco abuse 3/13/2019    Vitamin D deficiency 2017     Past Surgical History:   Procedure Laterality Date    HX ORTHOPAEDIC      right arm surgery as a child    HX OTHER SURGICAL      scrotal abscess drainage     Family History   Problem Relation Age of Onset    Diabetes Mother     Hypertension Mother     Heart Failure Mother     Cancer Father     Diabetes Sister          at age 28y from MI, stroke, kidney failure    Hypertension Sister     Kidney Disease Sister     Stroke Sister     Heart Attack Sister     Breast Cancer Sister      Social History     Tobacco Use    Smoking status: Current Every Day Smoker     Packs/day: 1.00    Smokeless tobacco: Never Used   Substance Use Topics    Alcohol use: Yes     Comment: occ    Drug use: No       REVIEW OF SYSTEMS:   A ten system review of constitutional, cardiovascular, respiratory, musculoskeletal, endocrine, skin, SHEENT, genitourinary, psychiatric and neurologic systems was obtained and is unremarkable with the exception of numbness    EXAMINATION:   Visit Vitals  /88   Pulse 87   Ht 6' 3\" (1.905 m)   Wt 202 lb (91.6 kg)   SpO2 98%   BMI 25.25 kg/m²        General:   General appearance: Pt is in no acute distress   Distal pulses are preserved  Fundoscopic Exam: Normal    Neurological Examination:   Mental Status: AAO x3. Speech is fluent.  Follows commands, has normal fund of knowledge, attention, short term recall, comprehension and insight. Cranial Nerves: Visual fields are full. PERRL, Extraocular movements are full. Facial sensation intact. Facial movement intact. Hearing intact to conversation. Palate elevates symmetrically. Shoulder shrug symmetric. Tongue midline. Motor: Strength is 5/5 in all 4 ext. No atrophy. Tone: Normal    Sensation: Light touch - Normal    Reflexes: DTRs 2+ throughout. Coordination/Cerebellar: Intact to finger-nose-finger     Gait: Casual gait is normal.     Skin: No significant bruising or lacerations. Laboratory review:   Results for orders placed or performed in visit on 09/13/19   AMB POC HEMOGLOBIN A1C   Result Value Ref Range    Hemoglobin A1c (POC) 5.8 %       Imaging review:  4/21/2019  HbA1c 9.2      Imaging review  4/21/2019  MRI brain  Small acute infarction involving the left lateral thalamus, posterior limb of internal capsule on the left    CT angiogram neck  Normal    CT angiogram head  Normal    Documentation review:   I requested records from [facility] providers in preparation for my face-to-face visit with him today regarding him presenting complaint. I spent 35 minutes performing a non-face-to-face review of these records and they are summarized below. I reviewed neurology notes from [facility]. The patient was admitted on 4/21/2019. The patient went to bed the night prior at 11 PM and woke up in the morning feeling like his right side of the face was numb as well as tingling in the second, third and fourth digit of the right hand. Blood pressure in the ER was 195/115. The patient's neurological examination is completely normal except for subjective numbness over the right V2 and V3 distribution to light touch. Patient does have hypertension, hyperlipidemia and diabetes. The patient smokes half pack a day since the last 30 years. Plan is to do MRI of the brain and a stroke work-up.     Assessment/Plan:   Yanira Moore Cynthia Molina is a 44 y.o. male who presented to the neurology office for management of stroke. The patient does have a left thalamic and posterior limb of internal capsule infarct. The patient symptoms have almost resolved with mild numbness on the right side of the face and hands subjectively. The patient does have significant risk factors in the form of hypertension, hyperlipidemia, diabetes and smoking. Patient is taking aspirin 81 mg a day and Lipitor 40 mg daily. To complete the stroke work-up, will proceed with getting transthoracic echo. 1.  Goal LDL less than 70  2. Goal blood pressure less than 140/100  3. Goal HbA1c less than 7  4. Spent 3-5 minutes discussing the risks of smoking and the benefits of smoking cessation as well as the long term sequelae of smoking with the pt who verbalized her understanding. Reviewed strategies for success, including gradually decreasing the number of cigarettes smoked a day. Follow-up in 6 months    3 most recent PHQ Screens 9/13/2019   Little interest or pleasure in doing things Not at all   Feeling down, depressed, irritable, or hopeless Not at all   Total Score PHQ 2 0     Primary care to address possible depression if PHQ-9 score is more than 9. ICD-10-CM ICD-9-CM    1. Cerebrovascular accident (CVA), unspecified mechanism ([facility] I63.9 434.91 ECHO ADULT COMPLETE   2. Essential hypertension I10 401.9    3. Mixed hyperlipidemia E78.2 272.2    4. Tobacco use Z72.0 305.1    5. Controlled type 2 diabetes mellitus without complication, unspecified whether long term insulin use (HCC) E11.9 250.00       Thank you for allowing me to participate in the care of Mr. Tom Mcintyre. Please feel free to contact me if you have any questions. Preston Sidhu MD  Neurologist    CC: Yen Kebede NP  Fax: [phone]    This note was created using voice recognition software. Despite editing, there may be syntax errors.

## 2019-12-21 DIAGNOSIS — I10 ESSENTIAL HYPERTENSION: ICD-10-CM

## 2019-12-22 RX ORDER — HYDRALAZINE HYDROCHLORIDE 25 MG/1
TABLET, FILM COATED ORAL
Qty: 90 TAB | Refills: 2 | Status: SHIPPED | OUTPATIENT
Start: 2019-12-22 | End: 2020-03-13 | Stop reason: SDUPTHER

## 2020-03-13 ENCOUNTER — OFFICE VISIT (OUTPATIENT)
Dept: FAMILY MEDICINE CLINIC | Age: 50
End: 2020-03-13

## 2020-03-13 VITALS
DIASTOLIC BLOOD PRESSURE: 96 MMHG | OXYGEN SATURATION: 100 % | HEIGHT: 75 IN | WEIGHT: 214.4 LBS | BODY MASS INDEX: 26.66 KG/M2 | TEMPERATURE: 96 F | SYSTOLIC BLOOD PRESSURE: 148 MMHG | RESPIRATION RATE: 16 BRPM | HEART RATE: 76 BPM

## 2020-03-13 DIAGNOSIS — I10 ESSENTIAL HYPERTENSION: ICD-10-CM

## 2020-03-13 DIAGNOSIS — E78.00 HYPERCHOLESTEROLEMIA: ICD-10-CM

## 2020-03-13 DIAGNOSIS — Z12.5 PROSTATE CANCER SCREENING: ICD-10-CM

## 2020-03-13 DIAGNOSIS — E55.9 VITAMIN D DEFICIENCY: ICD-10-CM

## 2020-03-13 DIAGNOSIS — E83.52 HYPERCALCEMIA: ICD-10-CM

## 2020-03-13 DIAGNOSIS — E11.8 TYPE 2 DIABETES MELLITUS WITH COMPLICATION, WITHOUT LONG-TERM CURRENT USE OF INSULIN (HCC): Primary | ICD-10-CM

## 2020-03-13 LAB
BILIRUB UR QL STRIP: NEGATIVE
GLUCOSE UR-MCNC: NEGATIVE MG/DL
HBA1C MFR BLD HPLC: 6.4 %
KETONES P FAST UR STRIP-MCNC: NORMAL MG/DL
PH UR STRIP: 5.5 [PH] (ref 4.6–8)
PROT UR QL STRIP: NEGATIVE
SP GR UR STRIP: 1.02 (ref 1–1.03)
UA UROBILINOGEN AMB POC: NORMAL (ref 0.2–1)
URINALYSIS CLARITY POC: CLEAR
URINALYSIS COLOR POC: YELLOW
URINE BLOOD POC: NEGATIVE
URINE LEUKOCYTES POC: NEGATIVE
URINE NITRITES POC: NEGATIVE

## 2020-03-13 RX ORDER — HYDRALAZINE HYDROCHLORIDE 50 MG/1
TABLET, FILM COATED ORAL
Qty: 270 TAB | Refills: 3 | Status: SHIPPED | OUTPATIENT
Start: 2020-03-13 | End: 2021-02-26 | Stop reason: SDUPTHER

## 2020-03-13 NOTE — PROGRESS NOTES
HISTORY OF PRESENT ILLNESS  Toribio Hedrick is a 44 y.o. male. HPI  Patient comes in today for follow up HTN and DM  Hx of CVA - saw neurology in Nov 2019 - Per neurology:  Goal LDL less than 70, Goal blood pressure less than 140/100, Goal HbA1c less than 7. Needed to have echo done to complete stroke workup - states he had to reschedule that appointment due to his mother being in hospital.  Mother has hx of heart failure. She used to be on life vest until they placed AICD. Sister has hx of heart disease with stents. 4/1/19 - left 4th toe amputation and partial metatarsal   Compliant w/ meds, diabetic diet, and exercise. Denies any tingling sensation, polyuria and polydipsia. No blurred vision. No significant weight changes. Feeling well since last OV. Compliant w/ meds, low salt diet, and exercise. No home bp monitoring. No swelling, headache or dizziness. No chest pain, SOB, palpitations.   Otherwise feeling well since the last visit  Allergies   Allergen Reactions    Januvia [Sitagliptin] Diarrhea and Nausea and Vomiting       Past Medical History:   Diagnosis Date    DM (diabetes mellitus) ([address] 6/1/2010    HTN (hypertension) 6/1/2010    Hypercalcemia 3/13/2019    Hypertension     Osteomyelitis of left foot ([address] 3/13/2019    S/P amputation of lesser toe, left ([facility] 4/12/2019    TIA (transient ischemic attack) 04/21/2019    Tobacco abuse 3/13/2019    Vitamin D deficiency 8/7/2017       Past Surgical History:   Procedure Laterality Date    HX ORTHOPAEDIC      right arm surgery as a child    HX OTHER SURGICAL      scrotal abscess drainage       Social History     Socioeconomic History    Marital status:      Spouse name: Not on file    Number of children: Not on file    Years of education: Not on file    Highest education level: Not on file   Occupational History    Not on file   Social Needs    Financial resource strain: Not on file    Food insecurity     Worry: Not on file Inability: Not on file    Transportation needs     Medical: Not on file     Non-medical: Not on file   Tobacco Use    Smoking status: Current Every Day Smoker     Packs/day: 1.00    Smokeless tobacco: Never Used   Substance and Sexual Activity    Alcohol use: Yes     Comment: occ    Drug use: No    Sexual activity: Yes     Partners: Female   Lifestyle    Physical activity     Days per week: Not on file     Minutes per session: Not on file    Stress: Not on file   Relationships    Social connections     Talks on phone: Not on file     Gets together: Not on file     Attends Judaism service: Not on file     Active member of club or organization: Not on file     Attends meetings of clubs or organizations: Not on file     Relationship status: Not on file    Intimate partner violence     Fear of current or ex partner: Not on file     Emotionally abused: Not on file     Physically abused: Not on file     Forced sexual activity: Not on file   Other Topics Concern    Not on file   Social History Narrative    Not on file       Family History   Problem Relation Age of Onset    Diabetes Mother     Hypertension Mother     Heart Failure Mother     Cancer Father     Diabetes Sister          at age 28y from MI, stroke, kidney failure    Hypertension Sister     Kidney Disease Sister     Stroke Sister     Heart Attack Sister     Breast Cancer Sister        Current Outpatient Medications   Medication Sig    hydrALAZINE (APRESOLINE) 25 mg tablet TAKE 1 TABLET BY MOUTH THREE TIMES DAILY    lisinopril (PRINIVIL, ZESTRIL) 40 mg tablet TAKE ONE TABLET BY MOUTH ONCE DAILY    amLODIPine (NORVASC) 10 mg tablet Take 1 Tab by mouth daily.     linaGLIPtin (TRADJENTA) 5 mg tablet TAKE ONE TABLET BY MOUTH ONCE DAILY    glipiZIDE (GLUCOTROL) 10 mg tablet TAKE ONE TABLET BY MOUTH TWICE DAILY WITH MEALS    metFORMIN ER (GLUCOPHAGE XR) 500 mg tablet TAKE TWO TABLETS WITH BREAKFAST AND TWO TABLETS WITH  DINNER    atorvastatin (LIPITOR) 40 mg tablet Take 1 Tab by mouth daily.  aspirin delayed-release 81 mg tablet Take 81 mg by mouth daily.  sildenafil citrate (VIAGRA) 100 mg tablet Take 1/2 to 1 tab by mouth 30-60 minutes prior to sex on empty stomach.  lancets misc MONITOR BG TWICE DAILY  DX E11.8  TYPE 2 DM    glucose blood VI test strips (BLOOD GLUCOSE TEST) strip MONITOR BG TWICE DAILY  DX E11.8  TYPE 2 DM    Blood-Glucose Meter monitoring kit MONITOR BG TWICE DAILY  DX E11.8  TYPE 2 DM     No current facility-administered medications for this visit. Review of Systems   Constitutional: Negative for chills, diaphoresis, fever, malaise/fatigue and weight loss. HENT: Negative for congestion, ear pain, sore throat and tinnitus. Eyes: Negative for blurred vision and double vision. Respiratory: Negative for cough, sputum production, shortness of breath and wheezing. Cardiovascular: Negative for chest pain, palpitations and leg swelling. Gastrointestinal: Negative for abdominal pain, blood in stool, constipation, diarrhea, nausea and vomiting. Genitourinary: Negative for dysuria, flank pain, frequency, hematuria and urgency. Musculoskeletal: Negative for back pain, joint pain and myalgias. Skin: Negative. Neurological: Negative for dizziness, tingling, sensory change, speech change, focal weakness and headaches. Psychiatric/Behavioral: Negative for depression. The patient is not nervous/anxious and does not have insomnia. Vitals:    03/13/20 1149   BP: (!) 157/92   Pulse: 76   Resp: 16   Temp: 96 °F (35.6 °C)   TempSrc: Oral   SpO2: 100%   Weight: 214 lb 6.4 oz (97.3 kg)   Height: 6' 3\" (1.905 m)     Physical Exam  Constitutional:       Appearance: Normal appearance. He is well-developed.       Comments: BP elevated   HENT:      Right Ear: Hearing, tympanic membrane, ear canal and external ear normal.      Left Ear: Hearing, tympanic membrane, ear canal and external ear normal. Nose: Nose normal.      Mouth/Throat:      Pharynx: Uvula midline. Neck:      Thyroid: No thyroid mass or thyromegaly. Vascular: No carotid bruit. Cardiovascular:      Rate and Rhythm: Normal rate and regular rhythm. Heart sounds: Normal heart sounds. Pulmonary:      Effort: Pulmonary effort is normal.      Breath sounds: Normal breath sounds. Abdominal:      General: Bowel sounds are normal.      Palpations: Abdomen is soft. Tenderness: There is no abdominal tenderness. Lymphadenopathy:      Head:      Right side of head: No submental, submandibular or tonsillar adenopathy. Left side of head: No submental, submandibular or tonsillar adenopathy. Cervical: No cervical adenopathy. Skin:     General: Skin is warm and dry. Neurological:      Mental Status: He is alert and oriented to person, place, and time. Psychiatric:         Behavior: Behavior normal. Behavior is cooperative. Thought Content: Thought content normal.         Judgment: Judgment normal.       ASSESSMENT and PLAN    ICD-10-CM ICD-9-CM    1. Type 2 diabetes mellitus with complication, without long-term current use of insulin (HCC) E11.8 250.90 CBC WITH AUTOMATED DIFF      METABOLIC PANEL, COMPREHENSIVE      AMB POC HEMOGLOBIN A1C      AMB POC URINALYSIS DIP STICK AUTO W/O MICRO      MICROALBUMIN, UR, RAND W/ MICROALB/CREAT RATIO   2. Essential hypertension I10 401.9 CBC WITH AUTOMATED DIFF      METABOLIC PANEL, COMPREHENSIVE      AMB POC URINALYSIS DIP STICK AUTO W/O MICRO      MICROALBUMIN, UR, RAND W/ MICROALB/CREAT RATIO      hydrALAZINE (APRESOLINE) 50 mg tablet   3. Hypercalcemia U67.74 776.17 METABOLIC PANEL, COMPREHENSIVE   4. Hypercholesterolemia E78.00 272.0 LIPID PANEL   5. Prostate cancer screening Z12.5 V76.44 PSA W/ REFLX FREE PSA   6. Vitamin D deficiency E55.9 268.9 VITAMIN D, 25 HYDROXY     Encounter Diagnoses   Name Primary?     Type 2 diabetes mellitus with complication, without long-term current use of insulin (HCC) Yes    Essential hypertension     Hypercalcemia     Hypercholesterolemia     Prostate cancer screening     Vitamin D deficiency      Orders Placed This Encounter    CBC WITH AUTOMATED DIFF    METABOLIC PANEL, COMPREHENSIVE    LIPID PANEL    MICROALBUMIN, UR, RAND W/ MICROALB/CREAT RATIO    VITAMIN D, 25 HYDROXY    PSA W/ REFLX FREE PSA    AMB POC HEMOGLOBIN A1C    AMB POC URINALYSIS DIP STICK AUTO W/O MICRO    hydrALAZINE (APRESOLINE) 50 mg tablet     Diagnoses and all orders for this visit:    1. Type 2 diabetes mellitus with complication, without long-term current use of insulin (HCC)  -     CBC WITH AUTOMATED DIFF  -     METABOLIC PANEL, COMPREHENSIVE  -     AMB POC HEMOGLOBIN A1C  -     AMB POC URINALYSIS DIP STICK AUTO W/O MICRO  -     MICROALBUMIN, UR, RAND W/ MICROALB/CREAT RATIO    2. Essential hypertension - not at goal, increase hydralazine to 50mg TID, continue lisinopril and amlodipine   -     CBC WITH AUTOMATED DIFF  -     METABOLIC PANEL, COMPREHENSIVE  -     AMB POC URINALYSIS DIP STICK AUTO W/O MICRO  -     MICROALBUMIN, UR, RAND W/ MICROALB/CREAT RATIO  -     hydrALAZINE (APRESOLINE) 50 mg tablet; TAKE 1 TABLET BY MOUTH THREE TIMES DAILY    3. Hypercalcemia  -     METABOLIC PANEL, COMPREHENSIVE    4. Hypercholesterolemia  -     LIPID PANEL    5. Prostate cancer screening  -     PSA W/ REFLX FREE PSA    6. Vitamin D deficiency  -     VITAMIN D, 25 HYDROXY      Follow-up and Dispositions    · Return in about 4 months (around 7/13/2020). lab results and schedule of future lab studies reviewed with patient  reviewed diet, exercise and weight control  cardiovascular risk and specific lipid/LDL goals reviewed    I have reviewed the patient's allergies and made any necessary changes. Medical, procedural, social and family histories have been reviewed and updated as medically indicated. I have reconciled and/or revised patient medications in the EMR.        I have discussed each diagnosis listed in this note with Merly De and/or their family. I have discussed treatment options and the risk/benefit analysis of those options, including safe use of medications and possible medication side effects. Through the use of shared decision making we have agreed to the above plan. The patient has received an after-visit summary and questions were answered concerning future plans. Joslyn Burgess, YOSSI-C    This note will not be viewable in [facility].

## 2020-03-13 NOTE — PROGRESS NOTES
Chief Complaint   Patient presents with    Diabetes    Hypertension       1. Have you been to the ER, urgent care clinic since your last visit? Hospitalized since your last visit? No    2. Have you seen or consulted any other health care providers outside of the [address] [place] [address] since your last visit? Include any pap smears or colon screening. No     Eye Exam - Pt aware overdue  Colonoscopy - Pt aware overdue   Pt declined flu shot.      Health Maintenance Due   Topic Date Due    Eye Exam Retinal or Dilated  07/10/1978    Foot Exam Q1  05/24/2018    Shingrix Vaccine Age 50> (1 of 2) 07/10/2018    FOBT Q1Y Age 54-65  07/10/2018    Influenza Age 5 to Adult  08/01/2019    MICROALBUMIN Q1  03/13/2020    Lipid Screen  03/13/2020

## 2020-03-14 LAB
25(OH)D3+25(OH)D2 SERPL-MCNC: 16.2 NG/ML (ref 30–100)
ALBUMIN SERPL-MCNC: 4.6 G/DL (ref 3.8–4.9)
ALBUMIN/CREAT UR: 33 MG/G CREAT (ref 0–29)
ALBUMIN/GLOB SERPL: 2.1 {RATIO} (ref 1.2–2.2)
ALP SERPL-CCNC: 80 IU/L (ref 39–117)
ALT SERPL-CCNC: 18 IU/L (ref 0–44)
AST SERPL-CCNC: 15 IU/L (ref 0–40)
BASOPHILS # BLD AUTO: 0.1 X10E3/UL (ref 0–0.2)
BASOPHILS NFR BLD AUTO: 1 %
BILIRUB SERPL-MCNC: <0.2 MG/DL (ref 0–1.2)
BUN SERPL-MCNC: 12 MG/DL (ref 6–24)
BUN/CREAT SERPL: 14 (ref 9–20)
CALCIUM SERPL-MCNC: 11.2 MG/DL (ref 8.7–10.2)
CHLORIDE SERPL-SCNC: 101 MMOL/L (ref 96–106)
CHOLEST SERPL-MCNC: 114 MG/DL (ref 100–199)
CO2 SERPL-SCNC: 24 MMOL/L (ref 20–29)
CREAT SERPL-MCNC: 0.83 MG/DL (ref 0.76–1.27)
CREAT UR-MCNC: 151.1 MG/DL
EOSINOPHIL # BLD AUTO: 0.4 X10E3/UL (ref 0–0.4)
EOSINOPHIL NFR BLD AUTO: 3 %
ERYTHROCYTE [DISTWIDTH] IN BLOOD BY AUTOMATED COUNT: 14.7 % (ref 11.6–15.4)
GLOBULIN SER CALC-MCNC: 2.2 G/DL (ref 1.5–4.5)
GLUCOSE SERPL-MCNC: 96 MG/DL (ref 65–99)
HCT VFR BLD AUTO: 41.4 % (ref 37.5–51)
HDLC SERPL-MCNC: 37 MG/DL
HGB BLD-MCNC: 13.9 G/DL (ref 13–17.7)
IMM GRANULOCYTES # BLD AUTO: 0 X10E3/UL (ref 0–0.1)
IMM GRANULOCYTES NFR BLD AUTO: 0 %
INTERPRETATION, 910389: NORMAL
LDLC SERPL CALC-MCNC: 68 MG/DL (ref 0–99)
LYMPHOCYTES # BLD AUTO: 3.4 X10E3/UL (ref 0.7–3.1)
LYMPHOCYTES NFR BLD AUTO: 28 %
Lab: NORMAL
MCH RBC QN AUTO: 27.7 PG (ref 26.6–33)
MCHC RBC AUTO-ENTMCNC: 33.6 G/DL (ref 31.5–35.7)
MCV RBC AUTO: 83 FL (ref 79–97)
MICROALBUMIN UR-MCNC: 50.6 UG/ML
MONOCYTES # BLD AUTO: 0.8 X10E3/UL (ref 0.1–0.9)
MONOCYTES NFR BLD AUTO: 6 %
NEUTROPHILS # BLD AUTO: 7.6 X10E3/UL (ref 1.4–7)
NEUTROPHILS NFR BLD AUTO: 62 %
PLATELET # BLD AUTO: 355 X10E3/UL (ref 150–450)
POTASSIUM SERPL-SCNC: 4.1 MMOL/L (ref 3.5–5.2)
PROT SERPL-MCNC: 6.8 G/DL (ref 6–8.5)
PSA SERPL-MCNC: 0.6 NG/ML (ref 0–4)
RBC # BLD AUTO: 5.02 X10E6/UL (ref 4.14–5.8)
REFLEX CRITERIA: NORMAL
SODIUM SERPL-SCNC: 139 MMOL/L (ref 134–144)
TRIGL SERPL-MCNC: 46 MG/DL (ref 0–149)
VLDLC SERPL CALC-MCNC: 9 MG/DL (ref 5–40)
WBC # BLD AUTO: 12.3 X10E3/UL (ref 3.4–10.8)

## 2020-03-16 DIAGNOSIS — E55.9 VITAMIN D DEFICIENCY: Primary | ICD-10-CM

## 2020-03-16 RX ORDER — ERGOCALCIFEROL 1.25 MG/1
50000 CAPSULE ORAL
Qty: 12 CAP | Refills: 3 | Status: SHIPPED | OUTPATIENT
Start: 2020-03-16 | End: 2021-10-18 | Stop reason: SDUPTHER

## 2020-03-16 NOTE — PROGRESS NOTES
RECOMMENDATIONS:  Diabetes under good control. Liver and kidney function normal.  Blood counts normal (not anemic). Cholesterol numbers look great! Prostate cancer screening. Vitamin D is low. Please fill prescription for Vitamin D to take once weekly. I have enclosed some information on Vitamin D deficiency. Need to follow up with endocrinology for elevated calcium levels.

## 2020-04-03 ENCOUNTER — CLINICAL SUPPORT (OUTPATIENT)
Dept: FAMILY MEDICINE CLINIC | Age: 50
End: 2020-04-03

## 2020-04-03 VITALS — HEART RATE: 90 BPM | DIASTOLIC BLOOD PRESSURE: 95 MMHG | SYSTOLIC BLOOD PRESSURE: 175 MMHG

## 2020-04-03 DIAGNOSIS — I10 ESSENTIAL HYPERTENSION: Primary | ICD-10-CM

## 2020-04-03 RX ORDER — SPIRONOLACTONE 25 MG/1
25 TABLET ORAL DAILY
Qty: 90 TAB | Refills: 1 | Status: SHIPPED | OUTPATIENT
Start: 2020-04-03 | End: 2020-10-09

## 2020-04-03 NOTE — PROGRESS NOTES
Chief Complaint   Patient presents with    Blood Pressure Check     Pt states took BP medication this morning.

## 2020-04-03 NOTE — PROGRESS NOTES
Patient comes in today for nurse visit for BP check    Vitals:    04/03/20 0839   BP: (!) 175/95   Pulse: 90     Will add spironolactone 25mg daily. Continue amlodipine, lisinopril, and hydralazine. Patient to monitor BP at home. Send readings via [facility] in 2 weeks. Orders Placed This Encounter    [facility] Blood Pressure Flowsheet     Order Specific Question:   After how many readings would you like to receive a notification of this patient's entries? Answer:   7    spironolactone (ALDACTONE) 25 mg tablet     Sig: Take 1 Tab by mouth daily.      Dispense:  90 Tab     Refill:  1

## 2020-04-06 ENCOUNTER — TELEPHONE (OUTPATIENT)
Dept: FAMILY MEDICINE CLINIC | Age: 50
End: 2020-04-06

## 2020-04-06 NOTE — TELEPHONE ENCOUNTER
Will add spironolactone 25mg daily. Continue amlodipine, lisinopril, and hydralazine. Patient to monitor BP at home.   Send readings via [facility] in 2 weeks.           Orders Placed This Encounter    [facility] Blood Pressure Flowsheet       Order Specific Question:   After how many readings would you like to receive a notification of this patient's entries?       Answer:   7    spironolactone (ALDACTONE) 25 mg tablet       Sig: Take 1 Tab by mouth daily.       Dispense:  90 Tab       Refill:  1

## 2020-04-06 NOTE — TELEPHONE ENCOUNTER
Verified patient with two type of identifiers. Informed pt of prescription. Pt verbalized understanding.

## 2020-05-13 ENCOUNTER — VIRTUAL VISIT (OUTPATIENT)
Dept: NEUROLOGY | Age: 50
End: 2020-05-13

## 2020-05-13 DIAGNOSIS — I63.9 CEREBROVASCULAR ACCIDENT (CVA), UNSPECIFIED MECHANISM (HCC): Primary | ICD-10-CM

## 2020-05-13 DIAGNOSIS — E78.2 MIXED HYPERLIPIDEMIA: ICD-10-CM

## 2020-05-13 DIAGNOSIS — Z72.0 TOBACCO USE: ICD-10-CM

## 2020-05-13 DIAGNOSIS — I10 ESSENTIAL HYPERTENSION: ICD-10-CM

## 2020-05-13 DIAGNOSIS — E11.9 CONTROLLED TYPE 2 DIABETES MELLITUS WITHOUT COMPLICATION, UNSPECIFIED WHETHER LONG TERM INSULIN USE (HCC): ICD-10-CM

## 2020-05-13 NOTE — PATIENT INSTRUCTIONS

## 2020-05-13 NOTE — PROGRESS NOTES
Neurology Note    Chief Complaint   Patient presents with    Follow-up     was not to proceed with transthoracic echo    TIA       HPI/Subjective  Jorge Licea is a 44 y.o. male who presented to the neurology office for management of stroke. On April 21, 2019, patient woke up in the morning because of right facial numbness, right hand numbness and foot drop in his right leg. Patient did go to [facility] where MRI of the brain was done and he was found to have a left thalamic and posterior capsule infarct. Patient was started on aspirin 81 mg a day. Patient's LDL was elevated and he was started on Lipitor 40 mg daily. Other risk factors include hypertension, poorly controlled diabetes, hyperlipidemia and he continues to smoke 1 pack a day. Since the time of discharge, there has been no recurrence of strokelike symptoms. He does have residual numbness in his right hand and right side of the face. Interval history:  - No new stroke like symptoms  - TTE is pending  - 1 PPD    Current Outpatient Medications   Medication Sig    spironolactone (ALDACTONE) 25 mg tablet Take 1 Tab by mouth daily.  ergocalciferol (ERGOCALCIFEROL) 1,250 mcg (50,000 unit) capsule Take 1 Cap by mouth every seven (7) days.  hydrALAZINE (APRESOLINE) 50 mg tablet TAKE 1 TABLET BY MOUTH THREE TIMES DAILY    lisinopril (PRINIVIL, ZESTRIL) 40 mg tablet TAKE ONE TABLET BY MOUTH ONCE DAILY    amLODIPine (NORVASC) 10 mg tablet Take 1 Tab by mouth daily.  linaGLIPtin (TRADJENTA) 5 mg tablet TAKE ONE TABLET BY MOUTH ONCE DAILY    glipiZIDE (GLUCOTROL) 10 mg tablet TAKE ONE TABLET BY MOUTH TWICE DAILY WITH MEALS    metFORMIN ER (GLUCOPHAGE XR) 500 mg tablet TAKE TWO TABLETS WITH BREAKFAST AND TWO TABLETS WITH  DINNER    atorvastatin (LIPITOR) 40 mg tablet Take 1 Tab by mouth daily.  aspirin delayed-release 81 mg tablet Take 81 mg by mouth daily.     sildenafil citrate (VIAGRA) 100 mg tablet Take 1/2 to 1 tab by mouth 30-60 minutes prior to sex on empty stomach.  lancets misc MONITOR BG TWICE DAILY  DX E11.8  TYPE 2 DM    glucose blood VI test strips (BLOOD GLUCOSE TEST) strip MONITOR BG TWICE DAILY  DX E11.8  TYPE 2 DM    Blood-Glucose Meter monitoring kit MONITOR BG TWICE DAILY  DX E11.8  TYPE 2 DM     No current facility-administered medications for this visit. Allergies   Allergen Reactions    Januvia [Sitagliptin] Diarrhea and Nausea and Vomiting     Past Medical History:   Diagnosis Date    DM (diabetes mellitus) ([address] 2010    HTN (hypertension) 2010    Hypercalcemia 3/13/2019    Hypertension     Osteomyelitis of left foot ([address] 3/13/2019    S/P amputation of lesser toe, left ([facility] 2019    TIA (transient ischemic attack) 2019    Tobacco abuse 3/13/2019    Vitamin D deficiency 2017     Past Surgical History:   Procedure Laterality Date    HX ORTHOPAEDIC      right arm surgery as a child    HX OTHER SURGICAL      scrotal abscess drainage     Family History   Problem Relation Age of Onset    Diabetes Mother     Hypertension Mother     Heart Failure Mother     Cancer Father     Diabetes Sister          at age 28y from MI, stroke, kidney failure    Hypertension Sister     Kidney Disease Sister     Stroke Sister     Heart Attack Sister     Breast Cancer Sister      Social History     Tobacco Use    Smoking status: Current Every Day Smoker     Packs/day: 1.00    Smokeless tobacco: Never Used   Substance Use Topics    Alcohol use: Yes     Comment: occ    Drug use: No       REVIEW OF SYSTEMS:   A ten system review of constitutional, cardiovascular, respiratory, musculoskeletal, endocrine, skin, SHEENT, genitourinary, psychiatric and neurologic systems was obtained and is unremarkable with the exception of numbness    EXAMINATION:   There were no vitals taken for this visit. General:  Exam limited because of virtual visit.     General appearance: Pt is in no acute distress Neurological Examination:   Mental Status: AAO x3. Speech is fluent. Follows commands, has normal fund of knowledge, attention, short term recall, comprehension and insight. Cranial Nerves:  Extraocular movements are full. Facial movement intact. Hearing intact to conversation. Shoulder shrug symmetric. Tongue midline. Motor: Strength is symmetric in all 4 ext. Sensation: Normal according to patient to light touch    Coordination/Cerebellar: Intact to finger-nose-finger     Gait: Casual gait is normal.     Laboratory review:   Results for orders placed or performed in visit on 03/13/20   CBC WITH AUTOMATED DIFF   Result Value Ref Range    WBC 12.3 (H) 3.4 - 10.8 x10E3/uL    RBC 5.02 4.14 - 5.80 x10E6/uL    HGB 13.9 13.0 - 17.7 g/dL    HCT 41.4 37.5 - 51.0 %    MCV 83 79 - 97 fL    MCH 27.7 26.6 - 33.0 pg    MCHC 33.6 31.5 - 35.7 g/dL    RDW 14.7 11.6 - 15.4 %    PLATELET 246 564 - 723 x10E3/uL    NEUTROPHILS 62 Not Estab. %    Lymphocytes 28 Not Estab. %    MONOCYTES 6 Not Estab. %    EOSINOPHILS 3 Not Estab. %    BASOPHILS 1 Not Estab. %    ABS. NEUTROPHILS 7.6 (H) 1.4 - 7.0 x10E3/uL    Abs Lymphocytes 3.4 (H) 0.7 - 3.1 x10E3/uL    ABS. MONOCYTES 0.8 0.1 - 0.9 x10E3/uL    ABS. EOSINOPHILS 0.4 0.0 - 0.4 x10E3/uL    ABS. BASOPHILS 0.1 0.0 - 0.2 x10E3/uL    IMMATURE GRANULOCYTES 0 Not Estab. %    ABS. IMM.  GRANS. 0.0 0.0 - 0.1 [id number]   METABOLIC PANEL, COMPREHENSIVE   Result Value Ref Range    Glucose 96 65 - 99 mg/dL    BUN 12 6 - 24 mg/dL    Creatinine 0.83 0.76 - 1.27 mg/dL    GFR est non- >59 mL/min/1.73    GFR est  >59 mL/min/1.73    BUN/Creatinine ratio 14 9 - 20    Sodium 139 134 - 144 mmol/L    Potassium 4.1 3.5 - 5.2 mmol/L    Chloride 101 96 - 106 mmol/L    CO2 24 20 - 29 mmol/L    Calcium 11.2 (H) 8.7 - 10.2 mg/dL    Protein, total 6.8 6.0 - 8.5 g/dL    Albumin 4.6 3.8 - 4.9 g/dL    GLOBULIN, TOTAL 2.2 1.5 - 4.5 g/dL    A-G Ratio 2.1 1.2 - 2.2    Bilirubin, total <0.2 0.0 - 1.2 mg/dL    Alk. phosphatase 80 39 - 117 IU/L    AST (SGOT) 15 0 - 40 IU/L    ALT (SGPT) 18 0 - 44 IU/L   LIPID PANEL   Result Value Ref Range    Cholesterol, total 114 100 - 199 mg/dL    Triglyceride 46 0 - 149 mg/dL    HDL Cholesterol 37 (L) >39 mg/dL    VLDL, calculated 9 5 - 40 mg/dL    LDL, calculated 68 0 - 99 mg/dL   MICROALBUMIN, UR, RAND W/ MICROALB/CREAT RATIO   Result Value Ref Range    Creatinine, urine 151.1 Not Estab. mg/dL    Microalbumin, urine 50.6 Not Estab. ug/mL    Microalb/Creat ratio (ug/mg creat.) 33 (H) 0 - 29 mg/g creat   VITAMIN D, 25 HYDROXY   Result Value Ref Range    VITAMIN D, 25-HYDROXY 16.2 (L) 30.0 - 100.0 ng/mL   PSA W/ REFLX FREE PSA   Result Value Ref Range    Prostate Specific Ag 0.6 0.0 - 4.0 ng/mL    Reflex Criteria Comment    CVD REPORT   Result Value Ref Range    INTERPRETATION Note    DIABETES PATIENT EDUCATION   Result Value Ref Range    PDF Image Not applicable    AMB POC HEMOGLOBIN A1C   Result Value Ref Range    Hemoglobin A1c (POC) 6.4 %   AMB POC URINALYSIS DIP STICK AUTO W/O MICRO   Result Value Ref Range    Color (UA POC) Yellow     Clarity (UA POC) Clear     Glucose (UA POC) Negative Negative    Bilirubin (UA POC) Negative Negative    Ketones (UA POC) Trace Negative    Specific gravity (UA POC) 1.025 1.001 - 1.035    Blood (UA POC) Negative Negative    pH (UA POC) 5.5 4.6 - 8.0    Protein (UA POC) Negative Negative    Urobilinogen (UA POC) 0.2 mg/dL 0.2 - 1    Nitrites (UA POC) Negative Negative    Leukocyte esterase (UA POC) Negative Negative       Imaging review:  4/21/2019  HbA1c 9.2      Imaging review  4/21/2019  MRI brain  Small acute infarction involving the left lateral thalamus, posterior limb of internal capsule on the left    CT angiogram neck  Normal    CT angiogram head  Normal    Documentation review:   None    Assessment/Plan:   Polo Sosa is a 44 y.o. male who presented to the neurology office for management of stroke.   The patient does have a left thalamic and posterior limb of internal capsule infarct. The patient symptoms have almost resolved with mild numbness on the right side of the face and hands subjectively. The patient does have significant risk factors in the form of hypertension, hyperlipidemia, diabetes and smoking. Patient is taking aspirin 81 mg a day and Lipitor 40 mg daily. TTE is pending. 1.  Goal LDL less than 70  2. Goal blood pressure less than 140/100  3. Goal HbA1c less than 7  4. Spent 3-5 minutes discussing the risks of smoking and the benefits of smoking cessation as well as the long term sequelae of smoking with the pt who verbalized her understanding. Reviewed strategies for success, including gradually decreasing the number of cigarettes smoked a day. Follow-up in 6 months    3 most recent PHQ Screens 3/13/2020   Little interest or pleasure in doing things Not at all   Feeling down, depressed, irritable, or hopeless Not at all   Total Score PHQ 2 0     Primary care to address possible depression if PHQ-9 score is more than 9. ICD-10-CM ICD-9-CM    1. Cerebrovascular accident (CVA), unspecified mechanism ([facility] I63.9 434.91    2. Essential hypertension I10 401.9    3. Mixed hyperlipidemia E78.2 272.2    4. Tobacco use Z72.0 305.1    5. Controlled type 2 diabetes mellitus without complication, unspecified whether long term insulin use (HCC) E11.9 250.00       Thank you for allowing me to participate in the care of Mr. Mery Sevilla. Please feel free to contact me if you have any questions. Jonathan Gomez MD  Neurologist    CC: Emy Martin NP  Fax: [phone]    This note was created using voice recognition software. Despite editing, there may be syntax errors. Patsy Green was seen by synchronous (real-time) audio-video technology on 05/13/20.      Consent:  He and/or his healthcare decision maker is aware that this patient-initiated Telehealth encounter is a billable service, with coverage as determined by his insurance carrier. He is aware that he may receive a bill and has provided verbal consent to proceed: Yes    I was in the office while conducting this encounter. Pursuant to the emergency declaration under the [facility], [facility] waiver authority and the [employer] Act, this Virtual  Visit was conducted, with patient's consent, to reduce the patient's risk of exposure to COVID-19 and provide continuity of care for an established patient. Services were provided through a video synchronous discussion virtually to substitute for in-person clinic visit.

## 2020-07-07 ENCOUNTER — TELEPHONE (OUTPATIENT)
Dept: FAMILY MEDICINE CLINIC | Age: 50
End: 2020-07-07

## 2020-07-07 RX ORDER — METFORMIN HYDROCHLORIDE 500 MG/1
1000 TABLET ORAL 2 TIMES DAILY WITH MEALS
Qty: 360 TAB | Refills: 3 | Status: SHIPPED | OUTPATIENT
Start: 2020-07-07 | End: 2021-07-12 | Stop reason: SDUPTHER

## 2020-07-07 RX ORDER — ATORVASTATIN CALCIUM 40 MG/1
40 TABLET, FILM COATED ORAL DAILY
Qty: 90 TAB | Refills: 3 | Status: SHIPPED | OUTPATIENT
Start: 2020-07-07 | End: 2021-07-12 | Stop reason: SDUPTHER

## 2020-07-07 NOTE — TELEPHONE ENCOUNTER
----- Message from Ja Dye sent at 2020  3:02 PM EDT -----  Regarding: NP Aditya  Patient return call  To: [facility]  Subject: NP Aditya/Telephone    Patient's first and last name: Jose Valerio  : 1970  ID numbers: #[id number] [id number]  Caller's first and last name and relationship (if not the patient): N/A  Best contact number(s): [phone]  Whose call is being returned: Johnny Merida, Nurse at practice. Details to clarify the request: Pt wants practice nurse Sulma to call him back.

## 2020-07-07 NOTE — TELEPHONE ENCOUNTER
Last OV:7/19/2019  Next Appt:8/13/20  Last Refill: 7/19/2019    Requested Prescriptions     Pending Prescriptions Disp Refills    atorvastatin (LIPITOR) 40 mg tablet 90 Tab 3     Sig: Take 1 Tab by mouth daily.

## 2020-07-07 NOTE — TELEPHONE ENCOUNTER
Verified patient with two type of identifiers. Informed pt of new prescription. Pt verbalized understanding.

## 2020-08-13 ENCOUNTER — OFFICE VISIT (OUTPATIENT)
Dept: FAMILY MEDICINE CLINIC | Age: 50
End: 2020-08-13
Payer: COMMERCIAL

## 2020-08-13 VITALS
DIASTOLIC BLOOD PRESSURE: 77 MMHG | HEIGHT: 75 IN | OXYGEN SATURATION: 99 % | RESPIRATION RATE: 14 BRPM | HEART RATE: 80 BPM | BODY MASS INDEX: 26.33 KG/M2 | SYSTOLIC BLOOD PRESSURE: 131 MMHG | WEIGHT: 211.8 LBS | TEMPERATURE: 97.7 F

## 2020-08-13 DIAGNOSIS — I10 ESSENTIAL HYPERTENSION: ICD-10-CM

## 2020-08-13 DIAGNOSIS — E11.8 TYPE 2 DIABETES MELLITUS WITH COMPLICATION, WITHOUT LONG-TERM CURRENT USE OF INSULIN (HCC): Primary | ICD-10-CM

## 2020-08-13 DIAGNOSIS — L30.9 ECZEMA, UNSPECIFIED TYPE: ICD-10-CM

## 2020-08-13 DIAGNOSIS — E83.52 HYPERCALCEMIA: ICD-10-CM

## 2020-08-13 LAB — HBA1C MFR BLD HPLC: 7.5 %

## 2020-08-13 PROCEDURE — 99214 OFFICE O/P EST MOD 30 MIN: CPT | Performed by: NURSE PRACTITIONER

## 2020-08-13 PROCEDURE — 83036 HEMOGLOBIN GLYCOSYLATED A1C: CPT | Performed by: NURSE PRACTITIONER

## 2020-08-13 RX ORDER — TRIAMCINOLONE ACETONIDE 1 MG/G
CREAM TOPICAL 2 TIMES DAILY
Qty: 90 G | Refills: 1 | Status: SHIPPED | OUTPATIENT
Start: 2020-08-13 | End: 2022-02-07 | Stop reason: SDUPTHER

## 2020-08-13 NOTE — PROGRESS NOTES
Chief Complaint   Patient presents with    Diabetes    Hypertension     Pt stopped Tradjenta due to vomiting and diarrhea x 3 weeks. 1. Have you been to the ER, urgent care clinic since your last visit? Hospitalized since your last visit? No    2. Have you seen or consulted any other health care providers outside of the [address] [place] [address] since your last visit? Include any pap smears or colon screening.  No    Health Maintenance Due   Topic Date Due    Pneumococcal 0-64 years (1 of 1 - PPSV23) 07/10/1974    Eye Exam Retinal or Dilated  07/10/1978    Colonoscopy  07/10/1986    Shingrix Vaccine Age 50> (1 of 2) 07/10/2018    Foot Exam Q1  05/01/2020    Influenza Age 5 to Adult  08/01/2020

## 2020-08-13 NOTE — PROGRESS NOTES
HISTORY OF PRESENT ILLNESS  Moris Sierra is a 44 y.o. male. HPI  Patient comes in today for follow up diabetes. Stopped tradjenta 3-4 weeks ago due to nausea and diarrhea, denies abd pain. Happened couple times before and has improved since stopping. Didn't take metformin for 1 month.  few days ago but due to dietary choices. Not exercising like he was due to COVID-19  Last a1c 6.4% in March 2020, 5.8% in Sept 2019, 7.3% in June 2019, 12.4% in March 2019 and >15% in May 2017. Patient does not tolerate GLP-1 like Ozempic due to nausea. Having same problem with DPP4. Denies any tingling sensation, polyuria and polydipsia. No blurred vision. No significant weight changes. Feeling well since last OV. Needs to schedule with podiatry for diabetic foot care. Compliant w/ meds, low salt diet, and exercise. No home bp monitoring. No swelling, headache or dizziness. No chest pain, SOB, palpitations. Otherwise feeling well since last OV  Hx of CVA - saw neurology in Nov 2019 - Per neurology:  Goal LDL less than 70, Goal blood pressure less than 140/100, Goal HbA1c less than 7. Needed to have echo done to complete stroke workup - states he had to reschedule that appointment due to his mother being in hospital.  Mother has hx of heart failure. She used to be on life vest until they placed AICD. Sister has hx of heart disease with stents.   4/1/19 - left 4th toe amputation and partial metatarsal   Allergies   Allergen Reactions    Januvia [Sitagliptin] Diarrhea and Nausea and Vomiting       Past Medical History:   Diagnosis Date    DM (diabetes mellitus) ([address] 6/1/2010    HTN (hypertension) 6/1/2010    Hypercalcemia 3/13/2019    Hypertension     Osteomyelitis of left foot ([address] 3/13/2019    S/P amputation of lesser toe, left ([address] 4/12/2019    TIA (transient ischemic attack) 04/21/2019    Tobacco abuse 3/13/2019    Vitamin D deficiency 8/7/2017       Past Surgical History:   Procedure Laterality Date    HX ORTHOPAEDIC      right arm surgery as a child    HX OTHER SURGICAL      scrotal abscess drainage       Social History     Socioeconomic History    Marital status:      Spouse name: Not on file    Number of children: Not on file    Years of education: Not on file    Highest education level: Not on file   Occupational History    Not on file   Social Needs    Financial resource strain: Not on file    Food insecurity     Worry: Not on file     Inability: Not on file    Transportation needs     Medical: Not on file     Non-medical: Not on file   Tobacco Use    Smoking status: Current Every Day Smoker     Packs/day: 1.00    Smokeless tobacco: Never Used   Substance and Sexual Activity    Alcohol use: Yes     Comment: occ    Drug use: No    Sexual activity: Yes     Partners: Female   Lifestyle    Physical activity     Days per week: Not on file     Minutes per session: Not on file    Stress: Not on file   Relationships    Social connections     Talks on phone: Not on file     Gets together: Not on file     Attends Jewish service: Not on file     Active member of club or organization: Not on file     Attends meetings of clubs or organizations: Not on file     Relationship status: Not on file    Intimate partner violence     Fear of current or ex partner: Not on file     Emotionally abused: Not on file     Physically abused: Not on file     Forced sexual activity: Not on file   Other Topics Concern    Not on file   Social History Narrative    Not on file       Family History   Problem Relation Age of Onset    Diabetes Mother     Hypertension Mother     Heart Failure Mother     Cancer Father     Diabetes Sister          at age 28y from MI, stroke, kidney failure    Hypertension Sister     Kidney Disease Sister     Stroke Sister     Heart Attack Sister     Breast Cancer Sister        Current Outpatient Medications   Medication Sig    metFORMIN (GLUCOPHAGE) 500 mg tablet Take 2 Tabs by mouth two (2) times daily (with meals).  atorvastatin (LIPITOR) 40 mg tablet Take 1 Tab by mouth daily.  spironolactone (ALDACTONE) 25 mg tablet Take 1 Tab by mouth daily.  ergocalciferol (ERGOCALCIFEROL) 1,250 mcg (50,000 unit) capsule Take 1 Cap by mouth every seven (7) days.  hydrALAZINE (APRESOLINE) 50 mg tablet TAKE 1 TABLET BY MOUTH THREE TIMES DAILY    lisinopril (PRINIVIL, ZESTRIL) 40 mg tablet TAKE ONE TABLET BY MOUTH ONCE DAILY    amLODIPine (NORVASC) 10 mg tablet Take 1 Tab by mouth daily.  glipiZIDE (GLUCOTROL) 10 mg tablet TAKE ONE TABLET BY MOUTH TWICE DAILY WITH MEALS    aspirin delayed-release 81 mg tablet Take 81 mg by mouth daily.  sildenafil citrate (VIAGRA) 100 mg tablet Take 1/2 to 1 tab by mouth 30-60 minutes prior to sex on empty stomach.  lancets misc MONITOR BG TWICE DAILY  DX E11.8  TYPE 2 DM    glucose blood VI test strips (BLOOD GLUCOSE TEST) strip MONITOR BG TWICE DAILY  DX E11.8  TYPE 2 DM    Blood-Glucose Meter monitoring kit MONITOR BG TWICE DAILY  DX E11.8  TYPE 2 DM    linaGLIPtin (TRADJENTA) 5 mg tablet TAKE ONE TABLET BY MOUTH ONCE DAILY (Patient not taking: Reported on 8/13/2020)     No current facility-administered medications for this visit. Review of Systems   Constitutional: Negative for chills, diaphoresis, fever, malaise/fatigue and weight loss. HENT: Negative for congestion, ear pain, sore throat and tinnitus. Eyes: Negative for blurred vision and double vision. Respiratory: Negative for cough, sputum production, shortness of breath and wheezing. Cardiovascular: Negative for chest pain, palpitations and leg swelling. Gastrointestinal: Negative for abdominal pain, blood in stool, constipation, diarrhea, nausea and vomiting. Genitourinary: Negative for dysuria, flank pain, frequency, hematuria and urgency. Musculoskeletal: Negative for back pain, joint pain and myalgias.    Skin: Positive for itching and rash (bilateral hands). Neurological: Negative for dizziness, tingling, sensory change, speech change, focal weakness and headaches. Psychiatric/Behavioral: Negative for depression. The patient is not nervous/anxious and does not have insomnia. Vitals:    08/13/20 0735   BP: 131/77   Pulse: 80   Resp: 14   Temp: 97.7 °F (36.5 °C)   TempSrc: Oral   SpO2: 99%   Weight: 211 lb 12.8 oz (96.1 kg)   Height: 6' 3\" (1.905 m)     Physical Exam  Vitals signs reviewed. Constitutional:       Appearance: He is well-developed. Comments: BP elevated today   HENT:      Right Ear: Hearing normal.      Left Ear: Hearing normal.   Neck:      Thyroid: No thyromegaly. Cardiovascular:      Rate and Rhythm: Normal rate and regular rhythm. Pulses:           Dorsalis pedis pulses are 2+ on the right side and 2+ on the left side. Heart sounds: Normal heart sounds. Pulmonary:      Effort: Pulmonary effort is normal.      Breath sounds: Normal breath sounds. Abdominal:      General: Abdomen is flat. Bowel sounds are normal.      Palpations: Abdomen is soft. Tenderness: There is no abdominal tenderness. Musculoskeletal:      Right lower leg: No edema. Left lower leg: No edema. Skin:     General: Skin is warm and dry. Findings: Rash present. Rash is papular (noted bilateral hands, right worse than left). Neurological:      Mental Status: He is alert and oriented to person, place, and time. Psychiatric:         Behavior: Behavior normal.         Thought Content: Thought content normal.     ASSESSMENT and PLAN    ICD-10-CM ICD-9-CM    1. Type 2 diabetes mellitus with complication, without long-term current use of insulin ([facility])  E11.8 250.90 AMB POC HEMOGLOBIN A1C   2. Essential hypertension  J25 960.8 METABOLIC PANEL, BASIC      AMB POC HEMOGLOBIN A1C   3. Eczema, unspecified type  L30.9 692.9 triamcinolone acetonide (KENALOG) 0.1 % topical cream   4.  Hypercalcemia  E83.52 275.42 Encounter Diagnoses   Name Primary?  Type 2 diabetes mellitus with complication, without long-term current use of insulin ([facility]) Yes    Essential hypertension     Eczema, unspecified type     Hypercalcemia      Orders Placed This Encounter    METABOLIC PANEL, BASIC    AMB POC HEMOGLOBIN A1C    triamcinolone acetonide (KENALOG) 0.1 % topical cream     Diagnoses and all orders for this visit:    1. Type 2 diabetes mellitus with complication, without long-term current use of insulin ([facility] - has been off tradjenta for 1 month. Does not tolerate GLP-1 or DPP4. Will consider SGLT2 since kidney function normal.  He notes that when his blood sugar was high, he had issues with yeast around tip of penis. If he canot tolerate SGLT2, will need basal insulin,  -     AMB POC HEMOGLOBIN A1C    2. Essential hypertension - stable. -     METABOLIC PANEL, BASIC  -     AMB POC HEMOGLOBIN A1C    3. Eczema, unspecified type  -     triamcinolone acetonide (KENALOG) 0.1 % topical cream; Apply  to affected area two (2) times a day. use thin layer    4. Hypercalcemia      Follow-up and Dispositions    · Return in about 4 months (around 12/13/2020), or if symptoms worsen or fail to improve.       lab results and schedule of future lab studies reviewed with patient  reviewed diet, exercise and weight control    I have reviewed the patient's allergies and made any necessary changes. Medical, procedural, social and family histories have been reviewed and updated as medically indicated. I have reconciled and/or revised patient medications in the EMR. I have discussed each diagnosis listed in this note with Polly Kraus and/or their family. I have discussed treatment options and the risk/benefit analysis of those options, including safe use of medications and possible medication side effects. Through the use of shared decision making we have agreed to the above plan.       The patient has received an after-visit summary and questions were answered concerning future plans. Joslyn Burgess, FNP-C    This note will not be viewable in MyChart.

## 2020-08-13 NOTE — PATIENT INSTRUCTIONS

## 2020-08-14 LAB
BUN SERPL-MCNC: 22 MG/DL (ref 6–24)
BUN/CREAT SERPL: 23 (ref 9–20)
CALCIUM SERPL-MCNC: 11.4 MG/DL (ref 8.7–10.2)
CHLORIDE SERPL-SCNC: 107 MMOL/L (ref 96–106)
CO2 SERPL-SCNC: 20 MMOL/L (ref 20–29)
CREAT SERPL-MCNC: 0.94 MG/DL (ref 0.76–1.27)
GLUCOSE SERPL-MCNC: 193 MG/DL (ref 65–99)
POTASSIUM SERPL-SCNC: 4.7 MMOL/L (ref 3.5–5.2)
SODIUM SERPL-SCNC: 140 MMOL/L (ref 134–144)

## 2020-08-14 NOTE — PROGRESS NOTES
RECOMMENDATIONS:  Need to see endocrinology for elevated calcium. A1c 7.5% - will send prescription for Farxiga 5mg once daily. Make sure you use the coupon card I gave you.

## 2020-10-09 DIAGNOSIS — I10 ESSENTIAL HYPERTENSION: ICD-10-CM

## 2020-10-09 RX ORDER — SPIRONOLACTONE 25 MG/1
TABLET ORAL
Qty: 90 TAB | Refills: 4 | Status: SHIPPED | OUTPATIENT
Start: 2020-10-09 | End: 2021-10-01 | Stop reason: SDUPTHER

## 2021-02-26 DIAGNOSIS — N52.9 ERECTILE DYSFUNCTION, UNSPECIFIED ERECTILE DYSFUNCTION TYPE: ICD-10-CM

## 2021-02-26 DIAGNOSIS — I10 ESSENTIAL HYPERTENSION: ICD-10-CM

## 2021-02-26 RX ORDER — LISINOPRIL 40 MG/1
TABLET ORAL
Qty: 90 TAB | Refills: 3 | Status: CANCELLED | OUTPATIENT
Start: 2021-02-26

## 2021-03-03 RX ORDER — HYDRALAZINE HYDROCHLORIDE 50 MG/1
TABLET, FILM COATED ORAL
Qty: 270 TAB | Refills: 3 | Status: SHIPPED | OUTPATIENT
Start: 2021-03-03 | End: 2022-02-07 | Stop reason: SDUPTHER

## 2021-03-03 RX ORDER — SILDENAFIL 100 MG/1
TABLET, FILM COATED ORAL
Qty: 30 TAB | Refills: 1 | Status: SHIPPED | OUTPATIENT
Start: 2021-03-03

## 2021-03-03 NOTE — TELEPHONE ENCOUNTER
Last OV: 8/13/20  Next Appt: none. Varthanat message sent  Last Refill: 3/13/20  Viagra: 6/13/2019    Requested Prescriptions     Pending Prescriptions Disp Refills    sildenafil citrate (VIAGRA) 100 mg tablet 30 Tab 1     Sig: Take 1/2 to 1 tab by mouth 30-60 minutes prior to sex on empty stomach.     hydrALAZINE (APRESOLINE) 50 mg tablet 270 Tab 3     Sig: TAKE 1 TABLET BY MOUTH THREE TIMES DAILY

## 2021-07-09 RX ORDER — METFORMIN HYDROCHLORIDE 500 MG/1
TABLET ORAL
Qty: 120 TABLET | Refills: 0 | OUTPATIENT
Start: 2021-07-09

## 2021-07-09 RX ORDER — ATORVASTATIN CALCIUM 40 MG/1
TABLET, FILM COATED ORAL
Qty: 30 TABLET | Refills: 0 | OUTPATIENT
Start: 2021-07-09

## 2021-07-12 RX ORDER — METFORMIN HYDROCHLORIDE 500 MG/1
1000 TABLET ORAL 2 TIMES DAILY WITH MEALS
Qty: 360 TABLET | Refills: 0 | Status: SHIPPED | OUTPATIENT
Start: 2021-07-12 | End: 2021-10-01 | Stop reason: SDUPTHER

## 2021-07-12 RX ORDER — ATORVASTATIN CALCIUM 40 MG/1
40 TABLET, FILM COATED ORAL DAILY
Qty: 90 TABLET | Refills: 0 | Status: SHIPPED | OUTPATIENT
Start: 2021-07-12 | End: 2021-10-01 | Stop reason: SDUPTHER

## 2021-07-12 NOTE — TELEPHONE ENCOUNTER
Pt scheduled and informed that appt is missed no continued refills until she can be seen. Pt verbalized understanding.

## 2021-10-01 ENCOUNTER — OFFICE VISIT (OUTPATIENT)
Dept: FAMILY MEDICINE CLINIC | Age: 51
End: 2021-10-01
Payer: COMMERCIAL

## 2021-10-01 VITALS
TEMPERATURE: 96.8 F | OXYGEN SATURATION: 100 % | HEART RATE: 87 BPM | RESPIRATION RATE: 12 BRPM | WEIGHT: 220.2 LBS | HEIGHT: 75 IN | DIASTOLIC BLOOD PRESSURE: 83 MMHG | BODY MASS INDEX: 27.38 KG/M2 | SYSTOLIC BLOOD PRESSURE: 137 MMHG

## 2021-10-01 DIAGNOSIS — I10 ESSENTIAL HYPERTENSION: ICD-10-CM

## 2021-10-01 DIAGNOSIS — Z11.59 NEED FOR HEPATITIS C SCREENING TEST: ICD-10-CM

## 2021-10-01 DIAGNOSIS — E78.00 HYPERCHOLESTEROLEMIA: ICD-10-CM

## 2021-10-01 DIAGNOSIS — E55.9 VITAMIN D DEFICIENCY: ICD-10-CM

## 2021-10-01 DIAGNOSIS — E11.8 TYPE 2 DIABETES MELLITUS WITH COMPLICATION, WITHOUT LONG-TERM CURRENT USE OF INSULIN (HCC): Primary | ICD-10-CM

## 2021-10-01 DIAGNOSIS — Z12.5 PROSTATE CANCER SCREENING: ICD-10-CM

## 2021-10-01 PROCEDURE — 99214 OFFICE O/P EST MOD 30 MIN: CPT | Performed by: NURSE PRACTITIONER

## 2021-10-01 RX ORDER — METFORMIN HYDROCHLORIDE 500 MG/1
1000 TABLET ORAL 2 TIMES DAILY WITH MEALS
Qty: 360 TABLET | Refills: 3 | Status: SHIPPED | OUTPATIENT
Start: 2021-10-01 | End: 2022-02-07 | Stop reason: SDUPTHER

## 2021-10-01 RX ORDER — SPIRONOLACTONE 25 MG/1
25 TABLET ORAL DAILY
Qty: 90 TABLET | Refills: 3 | Status: SHIPPED | OUTPATIENT
Start: 2021-10-01 | End: 2022-02-07 | Stop reason: SDUPTHER

## 2021-10-01 RX ORDER — GLIPIZIDE 10 MG/1
TABLET ORAL
Qty: 180 TABLET | Refills: 3 | Status: SHIPPED | OUTPATIENT
Start: 2021-10-01 | End: 2022-02-07 | Stop reason: SDUPTHER

## 2021-10-01 RX ORDER — ATORVASTATIN CALCIUM 40 MG/1
40 TABLET, FILM COATED ORAL DAILY
Qty: 90 TABLET | Refills: 3 | Status: SHIPPED | OUTPATIENT
Start: 2021-10-01 | End: 2022-02-07 | Stop reason: SDUPTHER

## 2021-10-01 RX ORDER — LISINOPRIL 40 MG/1
TABLET ORAL
Qty: 90 TABLET | Refills: 3 | Status: SHIPPED | OUTPATIENT
Start: 2021-10-01 | End: 2022-02-07 | Stop reason: SDUPTHER

## 2021-10-01 NOTE — PROGRESS NOTES
Chief Complaint   Patient presents with    Diabetes    Hypertension       1. Have you been to the ER, urgent care clinic since your last visit? Hospitalized since your last visit? No    2. Have you seen or consulted any other health care providers outside of the [address] [place] [address] since your last visit? Include any pap smears or colon screening.  No    Health Maintenance Due   Topic Date Due    Hepatitis C Screening  Never done    Pneumococcal 0-64 years (1 of 2 - PPSV23) Never done    Eye Exam Retinal or Dilated  Never done    COVID-19 Vaccine (1) Never done    Colorectal Cancer Screening Combo  Never done    Shingrix Vaccine Age 50> (1 of 2) Never done    Foot Exam Q1  05/01/2020    MICROALBUMIN Q1  03/13/2021    Lipid Screen  03/13/2021    A1C test (Diabetic or Prediabetic)  08/13/2021    Flu Vaccine (1) Never done

## 2021-10-01 NOTE — PROGRESS NOTES
Nerissa Madera (: 1970) is a 46 y.o. male, established patient, here for evaluation of the following chief complaint(s):  Diabetes and Hypertension       ASSESSMENT/PLAN:  Below is the assessment and plan developed based on review of pertinent history, physical exam, labs, studies, and medications. 1. Type 2 diabetes mellitus with complication, without long-term current use of insulin ([address] - patient to continue metformin, glipizide. Reordered [address] as he did not start previously. Patient not interested in basal insulin. -     CBC WITH AUTOMATED DIFF; Future  -     METABOLIC PANEL, COMPREHENSIVE; Future  -     LIPID PANEL; Future  -     TSH 3RD GENERATION; Future  -     HEMOGLOBIN A1C WITH EAG; Future  -     MICROALBUMIN, UR, RAND W/ MICROALB/CREAT RATIO; Future  -     URINALYSIS W/ RFLX MICROSCOPIC; Future  -     atorvastatin (LIPITOR) 40 mg tablet; Take 1 Tablet by mouth daily. , Normal, Disp-90 Tablet, R-3  -     metFORMIN (GLUCOPHAGE) 500 mg tablet; Take 2 Tablets by mouth two (2) times daily (with meals). , Normal, Disp-360 Tablet, R-3  -     dapagliflozin (Farxiga) 5 mg tab tablet; Take 1 Tablet by mouth daily. , Normal, Disp-90 Tablet, R-3  -     glipiZIDE (GLUCOTROL) 10 mg tablet; TAKE 1 TABLET BY MOUTH TWICE DAILY WITH MEALS, Normal, Disp-180 Tablet, R-3  2. Essential hypertension  -     CBC WITH AUTOMATED DIFF; Future  -     METABOLIC PANEL, COMPREHENSIVE; Future  -     LIPID PANEL; Future  -     TSH 3RD GENERATION; Future  -     MICROALBUMIN, UR, RAND W/ MICROALB/CREAT RATIO; Future  -     URINALYSIS W/ RFLX MICROSCOPIC; Future  -     spironolactone (ALDACTONE) 25 mg tablet; Take 1 Tablet by mouth daily. , Normal, Disp-90 Tablet, R-3  -     lisinopriL (PRINIVIL, ZESTRIL) 40 mg tablet; Take 1 tablet by mouth once daily, Normal, Disp-90 Tablet, R-3  3. Hypercholesterolemia  -     CBC WITH AUTOMATED DIFF; Future  -     METABOLIC PANEL, COMPREHENSIVE; Future  -     LIPID PANEL;  Future  - atorvastatin (LIPITOR) 40 mg tablet; Take 1 Tablet by mouth daily. , Normal, Disp-90 Tablet, R-3  4. Vitamin D deficiency  -     VITAMIN D, 25 HYDROXY; Future  5. Prostate cancer screening  -     PSA W/ REFLX FREE PSA; Future  6. Need for hepatitis C screening test  -     HEPATITIS C AB; Future    Return in about 3 months (around 2022), or if symptoms worsen or fail to improve. SUBJECTIVE/OBJECTIVE:  HPI   Patient comes in today for follow up diabetes  He has been at home, was eating pasta, cakes, cookies, ramen noodles, breads. Has been drinking more ETOH since mother  in April. . Had problems with insurance. Stopped tradjenta last year due to nausea and diarrhea. Did not start [place] due to insurance. Patient does not tolerate Ozempic due to nausea. Denies any tingling sensation, polyuria and polydipsia.  No blurred vision.  No significant weight changes.  generally feels well. Last a1c 7.5% in Aug 2020, 6.4% in 2020, 5.8% in 2019, 7.3% in 2019, 12.4% in 2019 and >15% in May 2017. Hx of CVA - saw neurology in 2019 - Per neurology:  Goal LDL less than 70, Goal blood pressure less than 140/100, Goal HbA1c less than 7.  Sister has hx of heart disease with stents.     19 - left 4th toe amputation and partial metatarsal   Needs to schedule with podiatry for diabetic foot care  Allergies   Allergen Reactions    Januvia [Sitagliptin] Diarrhea and Nausea and Vomiting    Ozempic [Semaglutide] Nausea Only    Tradjenta [Linagliptin] Nausea Only       Past Medical History:   Diagnosis Date    DM (diabetes mellitus) ([address] 2010    HTN (hypertension) 2010    Hypercalcemia 3/13/2019    Hypertension     Osteomyelitis of left foot ([address] 3/13/2019    S/P amputation of lesser toe, left ([address] 2019    TIA (transient ischemic attack) 2019    Tobacco abuse 3/13/2019    Vitamin D deficiency 2017       Past Surgical History:   Procedure Laterality Date    HX ORTHOPAEDIC      right arm surgery as a child    HX OTHER SURGICAL      scrotal abscess drainage       Social History     Socioeconomic History    Marital status:      Spouse name: Not on file    Number of children: Not on file    Years of education: Not on file    Highest education level: Not on file   Occupational History    Not on file   Tobacco Use    Smoking status: Current Every Day Smoker     Packs/day: 1.00     Types: Cigarettes    Smokeless tobacco: Never Used   Vaping Use    Vaping Use: Never used   Substance and Sexual Activity    Alcohol use: Yes     Comment: occ    Drug use: No    Sexual activity: Yes     Partners: Female   Other Topics Concern    Not on file   Social History Narrative    Not on file     Social Determinants of Health     Financial Resource Strain:     Difficulty of Paying Living Expenses:    Food Insecurity:     Worried About Running Out of Food in the Last Year:     Ran Out of Food in the Last Year:    Transportation Needs:     Lack of Transportation (Medical):      Lack of Transportation (Non-Medical):    Physical Activity:     Days of Exercise per Week:     Minutes of Exercise per Session:    Stress:     Feeling of Stress :    Social Connections:     Frequency of Communication with Friends and Family:     Frequency of Social Gatherings with Friends and Family:     Attends [employer]ani Services:     Active Member of Clubs or Organizations:     Attends Club or Organization Meetings:     Marital Status:    Intimate Partner Violence:     Fear of Current or Ex-Partner:     Emotionally Abused:     Physically Abused:     Sexually Abused:        Family History   Problem Relation Age of Onset    Diabetes Mother     Hypertension Mother     Heart Failure Mother    Liz Glover Father     Diabetes Sister          at age 28y from MI, stroke, kidney failure    Hypertension Sister     Kidney Disease Sister     Stroke Sister     Heart Attack Sister     Breast Cancer Sister        Current Outpatient Medications   Medication Sig    atorvastatin (LIPITOR) 40 mg tablet Take 1 Tablet by mouth daily.  metFORMIN (GLUCOPHAGE) 500 mg tablet Take 2 Tablets by mouth two (2) times daily (with meals).  spironolactone (ALDACTONE) 25 mg tablet Take 1 Tablet by mouth daily.  lisinopriL (PRINIVIL, ZESTRIL) 40 mg tablet Take 1 tablet by mouth once daily    dapagliflozin (Farxiga) 5 mg tab tablet Take 1 Tablet by mouth daily.  glipiZIDE (GLUCOTROL) 10 mg tablet TAKE 1 TABLET BY MOUTH TWICE DAILY WITH MEALS    hydrALAZINE (APRESOLINE) 50 mg tablet TAKE 1 TABLET BY MOUTH THREE TIMES DAILY    triamcinolone acetonide (KENALOG) 0.1 % topical cream Apply  to affected area two (2) times a day. use thin layer    aspirin delayed-release 81 mg tablet Take 81 mg by mouth daily.  lancets misc MONITOR BG TWICE DAILY  DX E11.8  TYPE 2 DM    glucose blood VI test strips (BLOOD GLUCOSE TEST) strip MONITOR BG TWICE DAILY  DX E11.8  TYPE 2 DM    Blood-Glucose Meter monitoring kit MONITOR BG TWICE DAILY  DX E11.8  TYPE 2 DM    sildenafil citrate (VIAGRA) 100 mg tablet Take 1/2 to 1 tab by mouth 30-60 minutes prior to sex on empty stomach. (Patient not taking: Reported on 10/1/2021)    ergocalciferol (ERGOCALCIFEROL) 1,250 mcg (50,000 unit) capsule Take 1 Cap by mouth every seven (7) days. (Patient not taking: Reported on 10/1/2021)     No current facility-administered medications for this visit. Review of Systems   Constitutional: Negative for appetite change, chills, fatigue, fever and unexpected weight change. Respiratory: Negative for cough and shortness of breath. Cardiovascular: Negative for chest pain, palpitations and leg swelling. Gastrointestinal: Negative for abdominal pain, constipation, diarrhea, nausea and vomiting. Endocrine: Negative for polydipsia, polyphagia and polyuria. Genitourinary: Negative for dysuria, frequency and urgency.    Neurological: Negative for dizziness, light-headedness, numbness and headaches. Psychiatric/Behavioral: Negative for dysphoric mood and sleep disturbance. The patient is not nervous/anxious. Vitals:    10/01/21 1410   BP: 137/83   Pulse: 87   Resp: 12   Temp: 96.8 °F (36 °C)   TempSrc: Oral   SpO2: 100%   Weight: 220 lb 3.2 oz (99.9 kg)   Height: 6' 3\" (1.905 m)     Physical Exam  Constitutional:       Appearance: Normal appearance. He is well-developed. HENT:      Right Ear: Hearing, tympanic membrane, ear canal and external ear normal.      Left Ear: Hearing, tympanic membrane, ear canal and external ear normal.      Nose: Nose normal.      Mouth/Throat:      Pharynx: Uvula midline. Cardiovascular:      Rate and Rhythm: Normal rate and regular rhythm. Heart sounds: Normal heart sounds. Pulmonary:      Effort: Pulmonary effort is normal.      Breath sounds: Normal breath sounds. Abdominal:      General: Bowel sounds are normal.      Palpations: Abdomen is soft. Tenderness: There is no abdominal tenderness. Lymphadenopathy:      Head:      Right side of head: No submental, submandibular or tonsillar adenopathy. Left side of head: No submental, submandibular or tonsillar adenopathy. Cervical: No cervical adenopathy. Skin:     General: Skin is warm and dry. Neurological:      Mental Status: He is alert and oriented to person, place, and time. Psychiatric:         Behavior: Behavior normal. Behavior is cooperative. Thought Content: Thought content normal.         Judgment: Judgment normal.           On this date 10/01/2021 I have spent 33 minutes reviewing previous notes, test results and face to face with the patient discussing the diagnosis and importance of compliance with the treatment plan as well as documenting on the day of the visit. An electronic signature was used to authenticate this note.   -- Shruti Hernandez NP

## 2021-10-02 LAB
25(OH)D3 SERPL-MCNC: 21.9 NG/ML (ref 30–100)
ALBUMIN SERPL-MCNC: 3.7 G/DL (ref 3.5–5)
ALBUMIN/GLOB SERPL: 1.2 {RATIO} (ref 1.1–2.2)
ALP SERPL-CCNC: 108 U/L (ref 45–117)
ALT SERPL-CCNC: 22 U/L (ref 12–78)
ANION GAP SERPL CALC-SCNC: 8 MMOL/L (ref 5–15)
APPEARANCE UR: CLEAR
AST SERPL-CCNC: 14 U/L (ref 15–37)
BASOPHILS # BLD: 0.1 K/UL (ref 0–0.1)
BASOPHILS NFR BLD: 1 % (ref 0–1)
BILIRUB SERPL-MCNC: 0.3 MG/DL (ref 0.2–1)
BILIRUB UR QL: NEGATIVE
BUN SERPL-MCNC: 17 MG/DL (ref 6–20)
BUN/CREAT SERPL: 16 (ref 12–20)
CALCIUM SERPL-MCNC: 10.9 MG/DL (ref 8.5–10.1)
CHLORIDE SERPL-SCNC: 106 MMOL/L (ref 97–108)
CHOLEST SERPL-MCNC: 152 MG/DL
CO2 SERPL-SCNC: 23 MMOL/L (ref 21–32)
COLOR UR: ABNORMAL
CREAT SERPL-MCNC: 1.05 MG/DL (ref 0.7–1.3)
CREAT UR-MCNC: 119 MG/DL
DIFFERENTIAL METHOD BLD: ABNORMAL
EOSINOPHIL # BLD: 0.4 K/UL (ref 0–0.4)
EOSINOPHIL NFR BLD: 4 % (ref 0–7)
ERYTHROCYTE [DISTWIDTH] IN BLOOD BY AUTOMATED COUNT: 15 % (ref 11.5–14.5)
EST. AVERAGE GLUCOSE BLD GHB EST-MCNC: 312 MG/DL
GLOBULIN SER CALC-MCNC: 3.2 G/DL (ref 2–4)
GLUCOSE SERPL-MCNC: 232 MG/DL (ref 65–100)
GLUCOSE UR STRIP.AUTO-MCNC: >1000 MG/DL
HBA1C MFR BLD: 12.5 % (ref 4–5.6)
HCT VFR BLD AUTO: 40.3 % (ref 36.6–50.3)
HCV AB SERPL QL IA: NONREACTIVE
HDLC SERPL-MCNC: 37 MG/DL
HDLC SERPL: 4.1 {RATIO} (ref 0–5)
HGB BLD-MCNC: 13.3 G/DL (ref 12.1–17)
HGB UR QL STRIP: NEGATIVE
IMM GRANULOCYTES # BLD AUTO: 0 K/UL (ref 0–0.04)
IMM GRANULOCYTES NFR BLD AUTO: 0 % (ref 0–0.5)
KETONES UR QL STRIP.AUTO: NEGATIVE MG/DL
LDLC SERPL CALC-MCNC: 92.2 MG/DL (ref 0–100)
LEUKOCYTE ESTERASE UR QL STRIP.AUTO: NEGATIVE
LYMPHOCYTES # BLD: 2.9 K/UL (ref 0.8–3.5)
LYMPHOCYTES NFR BLD: 27 % (ref 12–49)
MCH RBC QN AUTO: 27.4 PG (ref 26–34)
MCHC RBC AUTO-ENTMCNC: 33 G/DL (ref 30–36.5)
MCV RBC AUTO: 83.1 FL (ref 80–99)
MICROALBUMIN UR-MCNC: 5.4 MG/DL
MICROALBUMIN/CREAT UR-RTO: 45 MG/G (ref 0–30)
MONOCYTES # BLD: 0.9 K/UL (ref 0–1)
MONOCYTES NFR BLD: 8 % (ref 5–13)
NEUTS SEG # BLD: 6.4 K/UL (ref 1.8–8)
NEUTS SEG NFR BLD: 60 % (ref 32–75)
NITRITE UR QL STRIP.AUTO: NEGATIVE
NRBC # BLD: 0 K/UL (ref 0–0.01)
NRBC BLD-RTO: 0 PER 100 WBC
PH UR STRIP: 5.5 [PH] (ref 5–8)
PLATELET # BLD AUTO: 395 K/UL (ref 150–400)
PMV BLD AUTO: 10.4 FL (ref 8.9–12.9)
POTASSIUM SERPL-SCNC: 4.7 MMOL/L (ref 3.5–5.1)
PROT SERPL-MCNC: 6.9 G/DL (ref 6.4–8.2)
PROT UR STRIP-MCNC: NEGATIVE MG/DL
RBC # BLD AUTO: 4.85 M/UL (ref 4.1–5.7)
SODIUM SERPL-SCNC: 137 MMOL/L (ref 136–145)
SP GR UR REFRACTOMETRY: 1.03 (ref 1–1.03)
TRIGL SERPL-MCNC: 114 MG/DL (ref ?–150)
TSH SERPL DL<=0.05 MIU/L-ACNC: 0.54 UIU/ML (ref 0.36–3.74)
UROBILINOGEN UR QL STRIP.AUTO: 0.2 EU/DL (ref 0.2–1)
VLDLC SERPL CALC-MCNC: 22.8 MG/DL
WBC # BLD AUTO: 10.7 K/UL (ref 4.1–11.1)

## 2021-10-03 DIAGNOSIS — E11.8 TYPE 2 DIABETES MELLITUS WITH COMPLICATION, WITHOUT LONG-TERM CURRENT USE OF INSULIN (HCC): ICD-10-CM

## 2021-10-03 DIAGNOSIS — E78.00 HYPERCHOLESTEROLEMIA: ICD-10-CM

## 2021-10-03 DIAGNOSIS — I10 ESSENTIAL HYPERTENSION: ICD-10-CM

## 2021-10-03 LAB
PSA SERPL-MCNC: 0.6 NG/ML (ref 0–4)
REFLEX CRITERIA: NORMAL

## 2021-10-04 RX ORDER — ATORVASTATIN CALCIUM 40 MG/1
TABLET, FILM COATED ORAL
Qty: 30 TABLET | Refills: 0 | OUTPATIENT
Start: 2021-10-04

## 2021-10-04 RX ORDER — AMLODIPINE BESYLATE 10 MG/1
TABLET ORAL
Qty: 30 TABLET | Refills: 0 | OUTPATIENT
Start: 2021-10-04

## 2021-10-04 RX ORDER — GLIPIZIDE 10 MG/1
TABLET ORAL
Qty: 60 TABLET | Refills: 0 | OUTPATIENT
Start: 2021-10-04

## 2021-10-04 RX ORDER — DAPAGLIFLOZIN 5 MG/1
TABLET, FILM COATED ORAL
Qty: 30 TABLET | Refills: 0 | OUTPATIENT
Start: 2021-10-04

## 2021-10-12 ENCOUNTER — DOCUMENTATION ONLY (OUTPATIENT)
Dept: FAMILY MEDICINE CLINIC | Age: 51
End: 2021-10-12

## 2021-10-12 NOTE — PROGRESS NOTES
A1c 12.5% - poor control of diabetes. Need for you to take Katy Mckeon as discussed. Lorraine Gold is working on prior authorization. If we have difficulty approving, we may need to consider once daily insulin. Thyroid screen negative. Liver and kidney function normal.  Blood counts normal (not anemic). Cholesterol numbers look great! Prostate cancer screening negative. Hepatitis C screening negative. Vitamin D is low. Please continue prescription for Vitamin D to take once weekly. Would like for you to see endocrinology for elevated calcium levels.

## 2021-10-13 ENCOUNTER — TELEPHONE (OUTPATIENT)
Dept: FAMILY MEDICINE CLINIC | Age: 51
End: 2021-10-13

## 2021-10-13 DIAGNOSIS — E11.8 TYPE 2 DIABETES MELLITUS WITH COMPLICATION, WITHOUT LONG-TERM CURRENT USE OF INSULIN (HCC): ICD-10-CM

## 2021-10-13 DIAGNOSIS — E55.9 VITAMIN D DEFICIENCY: ICD-10-CM

## 2021-10-13 DIAGNOSIS — E11.40 TYPE 2 DIABETES MELLITUS WITH DIABETIC NEUROPATHY, WITHOUT LONG-TERM CURRENT USE OF INSULIN (HCC): Primary | ICD-10-CM

## 2021-10-13 DIAGNOSIS — E78.00 HYPERCHOLESTEROLEMIA: Primary | ICD-10-CM

## 2021-10-13 NOTE — PROGRESS NOTES
[facility] denied    Will see if [employer] covered. Orders Placed This Encounter    canagliflozin (Invokana) 100 mg tablet     Sig: Take 1 Tablet by mouth Daily (before breakfast).      Dispense:  90 Tablet     Refill:  3

## 2021-10-13 NOTE — TELEPHONE ENCOUNTER
----- Message from Bradly Zaragoza NP sent at 10/12/2021  6:23 PM EDT -----  A1c 12.5% - poor control of diabetes. Need for you to take [place] as discussed. Sherwin De La Fuente is working on prior authorization. If we have difficulty approving, we may need to consider once daily insulin. Thyroid screen negative. Liver and kidney function normal.  Blood counts normal (not anemic). Cholesterol numbers look great! Prostate cancer screening negative. Hepatitis C screening negative. Vitamin D is low. Please continue prescription for Vitamin D to take once weekly. Would like for you to see endocrinology for elevated calcium levels.

## 2021-10-18 RX ORDER — ERGOCALCIFEROL 1.25 MG/1
50000 CAPSULE ORAL
Qty: 12 CAPSULE | Refills: 3 | Status: SHIPPED | OUTPATIENT
Start: 2021-10-18 | End: 2022-02-07 | Stop reason: SDUPTHER

## 2021-10-18 NOTE — TELEPHONE ENCOUNTER
Orders Placed This Encounter   Roland LU     Referral Priority:   Routine     Referral Type:   Consultation     Referral Reason:   Specialty Services Required     Referred to Provider:   Clarence Pickering MD     Requested Specialty:   Endocrinology     Number of Visits Requested:   1    ergocalciferol (ERGOCALCIFEROL) 1,250 mcg (50,000 unit) capsule     Sig: Take 1 Capsule by mouth every seven (7) days. Dispense:  12 Capsule     Refill:  3    linaGLIPtin (TRADJENTA) 5 mg tablet     Sig: Take 1 Tablet by mouth daily.      Dispense:  90 Tablet     Refill:  3

## 2021-10-18 NOTE — TELEPHONE ENCOUNTER
Verified patient with two type of identifiers. Informed pt of lab results and/or prescription(s). Pt verbalized understanding. Pt states he does not want to go on insulin and would be willing to restart Tradjenta and deal with the upset stomach.

## 2022-02-07 ENCOUNTER — VIRTUAL VISIT (OUTPATIENT)
Dept: FAMILY MEDICINE CLINIC | Age: 52
End: 2022-02-07
Payer: COMMERCIAL

## 2022-02-07 DIAGNOSIS — I10 ESSENTIAL HYPERTENSION: ICD-10-CM

## 2022-02-07 DIAGNOSIS — E78.00 HYPERCHOLESTEROLEMIA: ICD-10-CM

## 2022-02-07 DIAGNOSIS — E55.9 VITAMIN D DEFICIENCY: ICD-10-CM

## 2022-02-07 DIAGNOSIS — Z89.422 S/P AMPUTATION OF LESSER TOE, LEFT (HCC): ICD-10-CM

## 2022-02-07 DIAGNOSIS — L30.9 ECZEMA, UNSPECIFIED TYPE: ICD-10-CM

## 2022-02-07 DIAGNOSIS — E11.40 TYPE 2 DIABETES MELLITUS WITH DIABETIC NEUROPATHY, WITHOUT LONG-TERM CURRENT USE OF INSULIN (HCC): Primary | ICD-10-CM

## 2022-02-07 PROBLEM — M86.9 OSTEOMYELITIS OF LEFT FOOT (HCC): Status: RESOLVED | Noted: 2019-03-13 | Resolved: 2022-02-07

## 2022-02-07 PROCEDURE — 99214 OFFICE O/P EST MOD 30 MIN: CPT | Performed by: NURSE PRACTITIONER

## 2022-02-07 RX ORDER — ERGOCALCIFEROL 1.25 MG/1
50000 CAPSULE ORAL
Qty: 12 CAPSULE | Refills: 3 | Status: SHIPPED | OUTPATIENT
Start: 2022-02-07

## 2022-02-07 RX ORDER — METFORMIN HYDROCHLORIDE 500 MG/1
1000 TABLET ORAL 2 TIMES DAILY WITH MEALS
Qty: 360 TABLET | Refills: 3 | Status: SHIPPED | OUTPATIENT
Start: 2022-02-07

## 2022-02-07 RX ORDER — SPIRONOLACTONE 25 MG/1
25 TABLET ORAL DAILY
Qty: 90 TABLET | Refills: 3 | Status: SHIPPED | OUTPATIENT
Start: 2022-02-07

## 2022-02-07 RX ORDER — HYDRALAZINE HYDROCHLORIDE 50 MG/1
TABLET, FILM COATED ORAL
Qty: 270 TABLET | Refills: 3 | Status: SHIPPED | OUTPATIENT
Start: 2022-02-07 | End: 2022-09-27

## 2022-02-07 RX ORDER — GLIPIZIDE 10 MG/1
TABLET ORAL
Qty: 180 TABLET | Refills: 3 | Status: SHIPPED | OUTPATIENT
Start: 2022-02-07

## 2022-02-07 RX ORDER — LISINOPRIL 40 MG/1
TABLET ORAL
Qty: 90 TABLET | Refills: 3 | Status: SHIPPED | OUTPATIENT
Start: 2022-02-07

## 2022-02-07 RX ORDER — ATORVASTATIN CALCIUM 40 MG/1
40 TABLET, FILM COATED ORAL DAILY
Qty: 90 TABLET | Refills: 3 | Status: SHIPPED | OUTPATIENT
Start: 2022-02-07

## 2022-02-07 RX ORDER — TRIAMCINOLONE ACETONIDE 1 MG/G
CREAM TOPICAL 2 TIMES DAILY
Qty: 90 G | Refills: 1 | Status: SHIPPED | OUTPATIENT
Start: 2022-02-07

## 2022-02-07 NOTE — PROGRESS NOTES
Chief Complaint   Patient presents with    Diabetes    Hypertension       1. Have you been to the ER, urgent care clinic since your last visit? Hospitalized since your last visit? No    2. Have you seen or consulted any other health care providers outside of the [address] [place] [address] since your last visit? Include any pap smears or colon screening.  No     Eye Exam - Pt aware overdue  Colonoscopy - Pt aware overdue     Health Maintenance Due   Topic Date Due    COVID-19 Vaccine (1) Never done    Eye Exam Retinal or Dilated  Never done    Colorectal Cancer Screening Combo  Never done    Foot Exam Q1  05/01/2020    A1C test (Diabetic or Prediabetic)  01/01/2022

## 2022-02-07 NOTE — PROGRESS NOTES
Leonel Cortez (: 1970) is a 46 y.o. male, established patient, here for evaluation of the following chief complaint(s):   Diabetes and Hypertension       ASSESSMENT/PLAN:  Below is the assessment and plan developed based on review of pertinent history, labs, studies, and medications. 1. Type 2 diabetes mellitus with diabetic neuropathy,  without long-term current use of insulin (HCC) - will check A1c. If not near goal, discussed with patient basal insulin for better control. Discussed long term effects of uncontrolled diabetes, including but not limited to stroke, heart attack, kidney failure, blindness, amputation  -     metFORMIN (GLUCOPHAGE) 500 mg tablet; Take 2 Tablets by mouth two (2) times daily (with meals). , Normal, Disp-360 Tablet, R-3  -     linaGLIPtin (TRADJENTA) 5 mg tablet; Take 1 Tablet by mouth daily. , Normal, Disp-90 Tablet, R-3  -     glipiZIDE (GLUCOTROL) 10 mg tablet; TAKE 1 TABLET BY MOUTH TWICE DAILY WITH MEALS, Normal, Disp-180 Tablet, R-3  -     atorvastatin (LIPITOR) 40 mg tablet; Take 1 Tablet by mouth daily. , Normal, Disp-90 Tablet, R-3  -     METABOLIC PANEL, COMPREHENSIVE; Future  -     HEMOGLOBIN A1C WITH EAG; Future  -     LIPID PANEL; Future  2. Hypercholesterolemia - check FLP  -     atorvastatin (LIPITOR) 40 mg tablet; Take 1 Tablet by mouth daily. , Normal, Disp-90 Tablet, R-3  -     METABOLIC PANEL, COMPREHENSIVE; Future  -     LIPID PANEL; Future  3. Essential hypertension - reports home readings normal  -     spironolactone (ALDACTONE) 25 mg tablet; Take 1 Tablet by mouth daily. , Normal, Disp-90 Tablet, R-3  -     lisinopriL (PRINIVIL, ZESTRIL) 40 mg tablet; Take 1 tablet by mouth once daily, Normal, Disp-90 Tablet, R-3  -     hydrALAZINE (APRESOLINE) 50 mg tablet; TAKE 1 TABLET BY MOUTH THREE TIMES DAILY, Normal, Disp-270 Tablet, R-3  -     METABOLIC PANEL, COMPREHENSIVE; Future  4.  Vitamin D deficiency  -     ergocalciferol (ERGOCALCIFEROL) 1,250 mcg (50,000 unit) capsule; Take 1 Capsule by mouth every seven (7) days. , Normal, Disp-12 Capsule, R-3  5. Eczema, unspecified type  -     triamcinolone acetonide (KENALOG) 0.1 % topical cream; Apply  to affected area two (2) times a day. use thin layer, Normal, Disp-90 g, R-1  6. S/p amputation of lesser toe, left    SUBJECTIVE/OBJECTIVE:  HPI   Patient is seen virtually for follow up diabetes  Just had 3 major holidays, not eating like he was supposed to, has been overeating. Monitors BG has been in 200s after eating. Has cut back on pasta but still eating a lot of bread. Eating sandwiches. Eating cakes and cookies. Stopped tradjenta in past due to nausea and upset stomach, but restarted so he would not have to consider insulin. Insurance denied [place] in 2021  Patient does not tolerate Ozempic due to nausea.    Denies any tingling sensation, polyuria and polydipsia.  No blurred vision.  No significant weight changes.  generally feels well.     Last a1c 12.5% Oct 2021, 7.5% in Aug 2020, 6.4% in March 2020, 5.8% in Sept 2019, 7.3% in June 2019, 12.4% in March 2019 and >15% in May 2017.      Hx of CVA - saw neurology in Nov 2019 - Per neurology:  Goal LDL less than 70, Goal blood pressure less than 140/100, Goal HbA1c less than 7.  Sister has hx of heart disease with stents.     4/1/19 - left 4th toe amputation and partial metatarsal   Needs to schedule with podiatry for diabetic foot care  Allergies   Allergen Reactions    Januvia [Sitagliptin] Diarrhea and Nausea and Vomiting    Ozempic [Semaglutide] Nausea Only    Tradjenta [Linagliptin] Nausea Only       Past Medical History:   Diagnosis Date    DM (diabetes mellitus) ([address] 6/1/2010    HTN (hypertension) 6/1/2010    Hypercalcemia 3/13/2019    Hypertension     Osteomyelitis of left foot ([address] 3/13/2019    S/P amputation of lesser toe, left ([address] 4/12/2019    TIA (transient ischemic attack) 04/21/2019    Tobacco abuse 3/13/2019    Vitamin D deficiency 8/7/2017       Past Surgical History:   Procedure Laterality Date    HX ORTHOPAEDIC      right arm surgery as a child    HX OTHER SURGICAL      scrotal abscess drainage       Social History     Socioeconomic History    Marital status:      Spouse name: Not on file    Number of children: Not on file    Years of education: Not on file    Highest education level: Not on file   Occupational History    Not on file   Tobacco Use    Smoking status: Current Every Day Smoker     Packs/day: 1.00     Types: Cigarettes    Smokeless tobacco: Never Used   Vaping Use    Vaping Use: Never used   Substance and Sexual Activity    Alcohol use: Yes     Comment: occ    Drug use: No    Sexual activity: Yes     Partners: Female   Other Topics Concern    Not on file   Social History Narrative    Not on file     Social Determinants of Health     Financial Resource Strain:     Difficulty of Paying Living Expenses: Not on file   Food Insecurity:     Worried About Running Out of Food in the Last Year: Not on file    Patricia of Food in the Last Year: Not on file   Transportation Needs:     Lack of Transportation (Medical): Not on file    Lack of Transportation (Non-Medical):  Not on file   Physical Activity:     Days of Exercise per Week: Not on file    Minutes of Exercise per Session: Not on file   Stress:     Feeling of Stress : Not on file   Social Connections:     Frequency of Communication with Friends and Family: Not on file    Frequency of Social Gatherings with Friends and Family: Not on file    Attends [employer] Services: Not on file    Active Member of Clubs or Organizations: Not on file    Attends Club or Organization Meetings: Not on file    Marital Status: Not on file   Intimate Partner Violence:     Fear of Current or Ex-Partner: Not on file    Emotionally Abused: Not on file    Physically Abused: Not on file    Sexually Abused: Not on file   Housing Stability:     Unable to Pay for Housing in the Last Year: Not on file    Number of Places Lived in the Last Year: Not on file    Unstable Housing in the Last Year: Not on file       Family History   Problem Relation Age of Onset    Diabetes Mother     Hypertension Mother     Heart Failure Mother     Cancer Father     Diabetes Sister          at age 28y from MI, stroke, kidney failure    Hypertension Sister     Kidney Disease Sister     Stroke Sister     Heart Attack Sister     Breast Cancer Sister        Current Outpatient Medications   Medication Sig    triamcinolone acetonide (KENALOG) 0.1 % topical cream Apply  to affected area two (2) times a day. use thin layer    spironolactone (ALDACTONE) 25 mg tablet Take 1 Tablet by mouth daily.  metFORMIN (GLUCOPHAGE) 500 mg tablet Take 2 Tablets by mouth two (2) times daily (with meals).  lisinopriL (PRINIVIL, ZESTRIL) 40 mg tablet Take 1 tablet by mouth once daily    linaGLIPtin (TRADJENTA) 5 mg tablet Take 1 Tablet by mouth daily.  hydrALAZINE (APRESOLINE) 50 mg tablet TAKE 1 TABLET BY MOUTH THREE TIMES DAILY    glipiZIDE (GLUCOTROL) 10 mg tablet TAKE 1 TABLET BY MOUTH TWICE DAILY WITH MEALS    ergocalciferol (ERGOCALCIFEROL) 1,250 mcg (50,000 unit) capsule Take 1 Capsule by mouth every seven (7) days.  atorvastatin (LIPITOR) 40 mg tablet Take 1 Tablet by mouth daily.  sildenafil citrate (VIAGRA) 100 mg tablet Take 1/2 to 1 tab by mouth 30-60 minutes prior to sex on empty stomach.  aspirin delayed-release 81 mg tablet Take 81 mg by mouth daily.  lancets misc MONITOR BG TWICE DAILY  DX E11.8  TYPE 2 DM    glucose blood VI test strips (BLOOD GLUCOSE TEST) strip MONITOR BG TWICE DAILY  DX E11.8  TYPE 2 DM    Blood-Glucose Meter monitoring kit MONITOR BG TWICE DAILY  DX E11.8  TYPE 2 DM    canagliflozin (Invokana) 100 mg tablet Take 1 Tablet by mouth Daily (before breakfast).  (Patient not taking: Reported on 2022)     No current facility-administered medications for this visit. Review of Systems   Constitutional: Negative for appetite change, chills, fatigue, fever and unexpected weight change. Respiratory: Negative for cough and shortness of breath. Cardiovascular: Negative for chest pain, palpitations and leg swelling. Gastrointestinal: Negative for abdominal pain, constipation, diarrhea, nausea and vomiting. Endocrine: Negative for polydipsia, polyphagia and polyuria. Genitourinary: Negative for dysuria, frequency and urgency. Neurological: Negative for dizziness, light-headedness, numbness and headaches. Psychiatric/Behavioral: Negative for dysphoric mood and sleep disturbance. The patient is not nervous/anxious. No data recorded     Physical Exam  Constitutional: [x] Appears well-developed and well-nourished [x] No apparent distress      Mental status: [x] Alert and awake  [x] Oriented to person/place/time [x] Able to follow commands      Neck: [x] No visualized mass    Pulmonary/Chest: [x] Respiratory effort normal   [x] No visualized signs of difficulty breathing or respiratory distresS     Musculoskeletal:   [x] Normal range of motion of neck    Neurological:        [x] No Facial Asymmetry (Cranial nerve 7 motor function) (limited exam due to video visit)           Skin:        [x] No significant exanthematous lesions or discoloration noted on facial skin            Psychiatric:       [x] Normal Affect       [x] No Hallucinations      On this date 02/07/2022 I have spent 30 minutes reviewing previous notes, test results and face to face (virtual) with the patient discussing the diagnosis and importance of compliance with the treatment plan as well as documenting on the day of the visit. Yas Robles, was evaluated through a synchronous (real-time) audio-video encounter. The patient (or guardian if applicable) is aware that this is a billable service, which includes applicable co-pays. Verbal consent to proceed has been obtained.  The visit was conducted pursuant to the emergency declaration under the [facility], [address] [place] [address] authority and the [employer] Act. Patient identification was verified, and a caregiver was present when appropriate. The patient was located at home in a state where the provider was licensed to provide care. An electronic signature was used to authenticate this note.   -- Melinda Turcios NP

## 2022-02-10 ENCOUNTER — CLINICAL SUPPORT (OUTPATIENT)
Dept: FAMILY MEDICINE CLINIC | Age: 52
End: 2022-02-10

## 2022-02-10 VITALS
DIASTOLIC BLOOD PRESSURE: 90 MMHG | HEART RATE: 81 BPM | BODY MASS INDEX: 27.78 KG/M2 | SYSTOLIC BLOOD PRESSURE: 166 MMHG | WEIGHT: 223.4 LBS | HEIGHT: 75 IN

## 2022-02-10 DIAGNOSIS — I10 ESSENTIAL HYPERTENSION: ICD-10-CM

## 2022-02-10 DIAGNOSIS — E78.00 HYPERCHOLESTEROLEMIA: ICD-10-CM

## 2022-02-10 DIAGNOSIS — E11.40 TYPE 2 DIABETES MELLITUS WITH DIABETIC NEUROPATHY, WITHOUT LONG-TERM CURRENT USE OF INSULIN (HCC): Primary | ICD-10-CM

## 2022-02-10 NOTE — PROGRESS NOTES
Eleanor Tello (: 1970) is a 46 y.o. male, established patient, here for evaluation of the following chief complaint(s):  Labs Only and Blood Pressure Check  NURSE VISIT ONLY    ASSESSMENT/PLAN:  Below is the assessment and plan developed based on review of pertinent history, physical exam, labs, studies, and medications. 1. Type 2 diabetes mellitus with diabetic neuropathy, without long-term current use of insulin (HCC)  -     LIPID PANEL  -     METABOLIC PANEL, COMPREHENSIVE  -     HEMOGLOBIN A1C WITH EAG  2. Essential hypertension  -     METABOLIC PANEL, COMPREHENSIVE  3. Hypercholesterolemia  -     LIPID PANEL  -     METABOLIC PANEL, COMPREHENSIVE    Vitals:    02/10/22 0747   BP: (!) 166/90   Pulse: 81   Weight: 223 lb 6.4 oz (101.3 kg)   Height: 6' 3\" (1.905 m)     Pt states normally takes lisinopril, spironolactone and 1 dose of hydralazine at night. Pt forgot last night. An electronic signature was used to authenticate this note.   -- Shefali Mckeon NP

## 2022-02-10 NOTE — PROGRESS NOTES
Pt states normally takes lisinopril, spironolactone and 1 dose of hydralazine at night. Pt forgot last night.

## 2022-02-11 LAB
ALBUMIN SERPL-MCNC: 4 G/DL (ref 3.5–5)
ALBUMIN/GLOB SERPL: 1.3 {RATIO} (ref 1.1–2.2)
ALP SERPL-CCNC: 93 U/L (ref 45–117)
ALT SERPL-CCNC: 30 U/L (ref 12–78)
ANION GAP SERPL CALC-SCNC: 4 MMOL/L (ref 5–15)
AST SERPL-CCNC: 13 U/L (ref 15–37)
BILIRUB SERPL-MCNC: 0.3 MG/DL (ref 0.2–1)
BUN SERPL-MCNC: 14 MG/DL (ref 6–20)
BUN/CREAT SERPL: 13 (ref 12–20)
CALCIUM SERPL-MCNC: 11.3 MG/DL (ref 8.5–10.1)
CHLORIDE SERPL-SCNC: 109 MMOL/L (ref 97–108)
CHOLEST SERPL-MCNC: 155 MG/DL
CO2 SERPL-SCNC: 27 MMOL/L (ref 21–32)
CREAT SERPL-MCNC: 1.04 MG/DL (ref 0.7–1.3)
EST. AVERAGE GLUCOSE BLD GHB EST-MCNC: 177 MG/DL
GLOBULIN SER CALC-MCNC: 3.1 G/DL (ref 2–4)
GLUCOSE SERPL-MCNC: 184 MG/DL (ref 65–100)
HBA1C MFR BLD: 7.8 % (ref 4–5.6)
HDLC SERPL-MCNC: 38 MG/DL
HDLC SERPL: 4.1 {RATIO} (ref 0–5)
LDLC SERPL CALC-MCNC: 84.6 MG/DL (ref 0–100)
POTASSIUM SERPL-SCNC: 5.3 MMOL/L (ref 3.5–5.1)
PROT SERPL-MCNC: 7.1 G/DL (ref 6.4–8.2)
SODIUM SERPL-SCNC: 140 MMOL/L (ref 136–145)
TRIGL SERPL-MCNC: 162 MG/DL (ref ?–150)
VLDLC SERPL CALC-MCNC: 32.4 MG/DL

## 2022-03-18 PROBLEM — E11.40 TYPE 2 DIABETES MELLITUS WITH DIABETIC NEUROPATHY (HCC): Status: ACTIVE | Noted: 2019-06-13

## 2022-03-19 PROBLEM — Z89.422 S/P AMPUTATION OF LESSER TOE, LEFT (HCC): Status: ACTIVE | Noted: 2019-04-12

## 2022-03-19 PROBLEM — E55.9 VITAMIN D DEFICIENCY: Status: ACTIVE | Noted: 2017-08-07

## 2022-03-19 PROBLEM — Z72.0 TOBACCO ABUSE: Status: ACTIVE | Noted: 2019-03-13

## 2022-03-20 PROBLEM — E83.52 HYPERCALCEMIA: Status: ACTIVE | Noted: 2019-03-13

## 2023-06-07 ENCOUNTER — OFFICE VISIT (OUTPATIENT)
Age: 53
End: 2023-06-07
Payer: MEDICAID

## 2023-06-07 VITALS
HEIGHT: 75 IN | WEIGHT: 213.8 LBS | OXYGEN SATURATION: 95 % | SYSTOLIC BLOOD PRESSURE: 180 MMHG | HEART RATE: 76 BPM | RESPIRATION RATE: 16 BRPM | TEMPERATURE: 98.5 F | BODY MASS INDEX: 26.58 KG/M2 | DIASTOLIC BLOOD PRESSURE: 94 MMHG

## 2023-06-07 DIAGNOSIS — E55.9 VITAMIN D DEFICIENCY, UNSPECIFIED: ICD-10-CM

## 2023-06-07 DIAGNOSIS — Z12.5 PROSTATE CANCER SCREENING: ICD-10-CM

## 2023-06-07 DIAGNOSIS — I10 ESSENTIAL (PRIMARY) HYPERTENSION: ICD-10-CM

## 2023-06-07 DIAGNOSIS — E11.40 TYPE 2 DIABETES MELLITUS WITH DIABETIC NEUROPATHY, WITHOUT LONG-TERM CURRENT USE OF INSULIN (HCC): Primary | ICD-10-CM

## 2023-06-07 DIAGNOSIS — E78.00 PURE HYPERCHOLESTEROLEMIA, UNSPECIFIED: ICD-10-CM

## 2023-06-07 DIAGNOSIS — N52.9 ERECTILE DYSFUNCTION, UNSPECIFIED ERECTILE DYSFUNCTION TYPE: ICD-10-CM

## 2023-06-07 DIAGNOSIS — Z11.4 ENCOUNTER FOR SCREENING FOR HIV: ICD-10-CM

## 2023-06-07 DIAGNOSIS — Z12.11 COLON CANCER SCREENING: ICD-10-CM

## 2023-06-07 LAB — HBA1C MFR BLD: 12.2 %

## 2023-06-07 PROCEDURE — 3080F DIAST BP >= 90 MM HG: CPT | Performed by: NURSE PRACTITIONER

## 2023-06-07 PROCEDURE — 83036 HEMOGLOBIN GLYCOSYLATED A1C: CPT | Performed by: NURSE PRACTITIONER

## 2023-06-07 PROCEDURE — 3077F SYST BP >= 140 MM HG: CPT | Performed by: NURSE PRACTITIONER

## 2023-06-07 PROCEDURE — 99215 OFFICE O/P EST HI 40 MIN: CPT | Performed by: NURSE PRACTITIONER

## 2023-06-07 PROCEDURE — PBSHW AMB POC HEMOGLOBIN A1C: Performed by: NURSE PRACTITIONER

## 2023-06-07 RX ORDER — SILDENAFIL 100 MG/1
TABLET, FILM COATED ORAL
Qty: 30 TABLET | Refills: 1 | Status: SHIPPED | OUTPATIENT
Start: 2023-06-07

## 2023-06-07 RX ORDER — HYDRALAZINE HYDROCHLORIDE 50 MG/1
50 TABLET, FILM COATED ORAL 3 TIMES DAILY
Qty: 270 TABLET | Refills: 3 | Status: SHIPPED | OUTPATIENT
Start: 2023-06-07

## 2023-06-07 RX ORDER — ERGOCALCIFEROL 1.25 MG/1
50000 CAPSULE ORAL
Qty: 12 CAPSULE | Refills: 3 | Status: SHIPPED | OUTPATIENT
Start: 2023-06-07

## 2023-06-07 RX ORDER — LISINOPRIL 40 MG/1
40 TABLET ORAL DAILY
Qty: 90 TABLET | Refills: 3 | Status: SHIPPED | OUTPATIENT
Start: 2023-06-07

## 2023-06-07 RX ORDER — SPIRONOLACTONE 25 MG/1
25 TABLET ORAL DAILY
Qty: 90 TABLET | Refills: 3 | Status: SHIPPED | OUTPATIENT
Start: 2023-06-07

## 2023-06-07 RX ORDER — GLIPIZIDE 10 MG/1
10 TABLET ORAL 2 TIMES DAILY WITH MEALS
Qty: 180 TABLET | Refills: 3 | Status: SHIPPED | OUTPATIENT
Start: 2023-06-07

## 2023-06-07 RX ORDER — ATORVASTATIN CALCIUM 40 MG/1
40 TABLET, FILM COATED ORAL DAILY
Qty: 90 TABLET | Refills: 3 | Status: SHIPPED | OUTPATIENT
Start: 2023-06-07

## 2023-06-07 SDOH — ECONOMIC STABILITY: FOOD INSECURITY: WITHIN THE PAST 12 MONTHS, YOU WORRIED THAT YOUR FOOD WOULD RUN OUT BEFORE YOU GOT MONEY TO BUY MORE.: NEVER TRUE

## 2023-06-07 SDOH — ECONOMIC STABILITY: HOUSING INSECURITY
IN THE LAST 12 MONTHS, WAS THERE A TIME WHEN YOU DID NOT HAVE A STEADY PLACE TO SLEEP OR SLEPT IN A SHELTER (INCLUDING NOW)?: NO

## 2023-06-07 SDOH — ECONOMIC STABILITY: FOOD INSECURITY: WITHIN THE PAST 12 MONTHS, THE FOOD YOU BOUGHT JUST DIDN'T LAST AND YOU DIDN'T HAVE MONEY TO GET MORE.: NEVER TRUE

## 2023-06-07 SDOH — ECONOMIC STABILITY: INCOME INSECURITY: HOW HARD IS IT FOR YOU TO PAY FOR THE VERY BASICS LIKE FOOD, HOUSING, MEDICAL CARE, AND HEATING?: NOT VERY HARD

## 2023-06-07 ASSESSMENT — ENCOUNTER SYMPTOMS
ABDOMINAL PAIN: 0
CONSTIPATION: 0
NAUSEA: 0
VOMITING: 0
DIARRHEA: 0
BLOOD IN STOOL: 0
COUGH: 0
SHORTNESS OF BREATH: 0

## 2023-06-07 ASSESSMENT — PATIENT HEALTH QUESTIONNAIRE - PHQ9
SUM OF ALL RESPONSES TO PHQ QUESTIONS 1-9: 2
SUM OF ALL RESPONSES TO PHQ QUESTIONS 1-9: 2
SUM OF ALL RESPONSES TO PHQ9 QUESTIONS 1 & 2: 2
SUM OF ALL RESPONSES TO PHQ QUESTIONS 1-9: 2
1. LITTLE INTEREST OR PLEASURE IN DOING THINGS: 1
2. FEELING DOWN, DEPRESSED OR HOPELESS: 1
SUM OF ALL RESPONSES TO PHQ QUESTIONS 1-9: 2

## 2023-06-07 NOTE — PROGRESS NOTES
Chief Complaint   Patient presents with    Diabetes     Follow up        1. \"Have you been to the ER, urgent care clinic since your last visit? Hospitalized since your last visit? \" no    2. \"Have you seen or consulted any other health care providers outside of the [address] [place] [address] since your last visit? \" no     3. For patients aged 39-70: Has the patient had a colonoscopy / FIT/ Cologuard? No      If the patient is female:    4. For patients aged 41-77: Has the patient had a mammogram within the past 2 years? N/A      5. For patients aged 21-65: Has the patient had a pap smear?  N/A      Health Maintenance Due   Topic Date Due    COVID-19 Vaccine (1) Never done    Pneumococcal 0-64 years Vaccine (1 - PCV) Never done    HIV screen  Never done    Diabetic retinal exam  Never done    Hepatitis B vaccine (1 of 3 - Risk 3-dose series) Never done    Colorectal Cancer Screen  Never done    Shingles vaccine (1 of 2) Never done    Diabetic foot exam  05/01/2020    Diabetic Alb to Cr ratio (uACR) test  10/01/2022    Depression Screen  02/07/2023    A1C test (Diabetic or Prediabetic)  02/10/2023    Lipids  02/10/2023    GFR test (Diabetes, CKD 3-4, OR last GFR 15-59)  02/10/2023
thick. Left foot:      Skin integrity: Callus and dry skin present. Toenail Condition: Left toenails are abnormally thick. 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 Skin:     General: Skin is warm and dry. Neurological:      Mental Status: He is alert and oriented to person, place, and time. Psychiatric:         Attention and Perception: Attention normal.         Mood and Affect: Mood and affect normal.         Speech: Speech normal.         Behavior: Behavior normal. Behavior is cooperative. Thought Content: Thought content normal.         Cognition and Memory: Cognition normal.         Judgment: Judgment normal.          On this date 6/7/2023 I have spent 41 minutes reviewing previous notes, test results and face to face with the patient discussing the diagnosis and importance of compliance with the treatment plan as well as documenting on the day of the visit. An electronic signature was used to authenticate this note.     --BEV Jensen NP

## 2023-06-08 LAB
25(OH)D3+25(OH)D2 SERPL-MCNC: 25.6 NG/ML (ref 30–100)
ALBUMIN SERPL-MCNC: 4.5 G/DL (ref 3.8–4.9)
ALBUMIN/CREAT UR: 622 MG/G CREAT (ref 0–29)
ALBUMIN/GLOB SERPL: 2 {RATIO} (ref 1.2–2.2)
ALP SERPL-CCNC: 105 IU/L (ref 44–121)
ALT SERPL-CCNC: 17 IU/L (ref 0–44)
APPEARANCE UR: CLEAR
AST SERPL-CCNC: 11 IU/L (ref 0–40)
BACTERIA #/AREA URNS HPF: NORMAL /[HPF]
BILIRUB SERPL-MCNC: 0.2 MG/DL (ref 0–1.2)
BILIRUB UR QL STRIP: NEGATIVE
BUN SERPL-MCNC: 12 MG/DL (ref 6–24)
BUN/CREAT SERPL: 13 (ref 9–20)
CALCIUM SERPL-MCNC: 11.3 MG/DL (ref 8.7–10.2)
CASTS URNS QL MICRO: NORMAL /LPF
CHLORIDE SERPL-SCNC: 99 MMOL/L (ref 96–106)
CHOLEST SERPL-MCNC: 161 MG/DL (ref 100–199)
CO2 SERPL-SCNC: 23 MMOL/L (ref 20–29)
COLOR UR: YELLOW
CREAT SERPL-MCNC: 0.93 MG/DL (ref 0.76–1.27)
CREAT UR-MCNC: 103.5 MG/DL
EGFRCR SERPLBLD CKD-EPI 2021: 98 ML/MIN/1.73
EPI CELLS #/AREA URNS HPF: NORMAL /HPF (ref 0–10)
ERYTHROCYTE [DISTWIDTH] IN BLOOD BY AUTOMATED COUNT: 15.1 % (ref 11.6–15.4)
GLOBULIN SER CALC-MCNC: 2.3 G/DL (ref 1.5–4.5)
GLUCOSE SERPL-MCNC: 277 MG/DL (ref 70–99)
GLUCOSE UR QL STRIP: ABNORMAL
HBA1C MFR BLD: 11.7 % (ref 4.8–5.6)
HCT VFR BLD AUTO: 46.4 % (ref 37.5–51)
HDLC SERPL-MCNC: 40 MG/DL
HGB BLD-MCNC: 15.6 G/DL (ref 13–17.7)
HGB UR QL STRIP: NEGATIVE
HIV 1+2 AB+HIV1 P24 AG SERPL QL IA: NON REACTIVE
IMP & REVIEW OF LAB RESULTS: NORMAL
KETONES UR QL STRIP: NEGATIVE
LDLC SERPL CALC-MCNC: 106 MG/DL (ref 0–99)
LEUKOCYTE ESTERASE UR QL STRIP: NEGATIVE
MCH RBC QN AUTO: 27.7 PG (ref 26.6–33)
MCHC RBC AUTO-ENTMCNC: 33.6 G/DL (ref 31.5–35.7)
MCV RBC AUTO: 82 FL (ref 79–97)
MICRO URNS: ABNORMAL
MICROALBUMIN UR-MCNC: 643.5 UG/ML
NITRITE UR QL STRIP: NEGATIVE
PH UR STRIP: 5.5 [PH] (ref 5–7.5)
PLATELET # BLD AUTO: 319 X10E3/UL (ref 150–450)
POTASSIUM SERPL-SCNC: 4.4 MMOL/L (ref 3.5–5.2)
PROT SERPL-MCNC: 6.8 G/DL (ref 6–8.5)
PROT UR QL STRIP: ABNORMAL
PSA SERPL-MCNC: 0.6 NG/ML (ref 0–4)
RBC # BLD AUTO: 5.63 X10E6/UL (ref 4.14–5.8)
RBC #/AREA URNS HPF: NORMAL /HPF (ref 0–2)
SODIUM SERPL-SCNC: 137 MMOL/L (ref 134–144)
SP GR UR STRIP: >=1.03 (ref 1–1.03)
TRIGL SERPL-MCNC: 77 MG/DL (ref 0–149)
TSH SERPL DL<=0.005 MIU/L-ACNC: 0.77 UIU/ML (ref 0.45–4.5)
UROBILINOGEN UR STRIP-MCNC: 0.2 MG/DL (ref 0.2–1)
VLDLC SERPL CALC-MCNC: 15 MG/DL (ref 5–40)
WBC # BLD AUTO: 10.7 X10E3/UL (ref 3.4–10.8)
WBC #/AREA URNS HPF: NORMAL /HPF (ref 0–5)

## 2023-06-19 ENCOUNTER — TELEPHONE (OUTPATIENT)
Age: 53
End: 2023-06-19

## 2023-06-19 NOTE — TELEPHONE ENCOUNTER
Verified patient with two type of identifiers. Spoke to pt - reviewed lab results per BEV Casey NP. Pt verbalized understanding.

## 2023-06-19 NOTE — TELEPHONE ENCOUNTER
----- Message from BEV Rinaldi NP sent at 6/12/2023  7:55 PM EDT -----  A1c 12.2% - diabetes uncontrolled as discussed in office. Need to take medications regularly. Work on diet and exercise. Aim for 45g-60g  carbohydrate per meal, eliminate the juice and all sweet drinks. You can see Eben Phillips for diabetes education if interested. Thyroid screen negative. Liver and kidney function normal.  Blood counts normal (not anemic). Prostate cancer screening negative. Cholesterol numbers look pretty good. Make sure you are taking atorvastatin daily. Microalbumin/creatinine ratio is a marker of the amount of protein in your urine. Goal is less than 30. Your value is 622. This indicates that your kidneys are starting to be slightly affected by your uncontrolled diabetes and/or blood pressure. The best thing you can do for your kidneys is to have good blood pressure and blood sugar control. You are also taking lisinopril which helps protect your kidneys from the effects of diabetes and high blood pressure. Have you seen endocrinology for elevated calcium levels? If not, you need to schedule with them.   I can place a new referral if needed    Continue Vitamin D supplement    HIV screening negative

## 2023-07-25 DIAGNOSIS — E78.00 PURE HYPERCHOLESTEROLEMIA, UNSPECIFIED: ICD-10-CM

## 2023-07-31 RX ORDER — ATORVASTATIN CALCIUM 40 MG/1
40 TABLET, FILM COATED ORAL DAILY
Qty: 90 TABLET | Refills: 3 | Status: SHIPPED | OUTPATIENT
Start: 2023-07-31

## 2024-01-19 ENCOUNTER — OFFICE VISIT (OUTPATIENT)
Age: 54
End: 2024-01-19
Payer: MEDICAID

## 2024-01-19 VITALS
BODY MASS INDEX: 26.55 KG/M2 | OXYGEN SATURATION: 97 % | SYSTOLIC BLOOD PRESSURE: 178 MMHG | RESPIRATION RATE: 20 BRPM | DIASTOLIC BLOOD PRESSURE: 108 MMHG | TEMPERATURE: 97.9 F | WEIGHT: 212.4 LBS | HEART RATE: 97 BPM

## 2024-01-19 DIAGNOSIS — Z12.11 COLON CANCER SCREENING: ICD-10-CM

## 2024-01-19 DIAGNOSIS — E11.40 TYPE 2 DIABETES MELLITUS WITH DIABETIC NEUROPATHY, WITHOUT LONG-TERM CURRENT USE OF INSULIN (HCC): Primary | ICD-10-CM

## 2024-01-19 DIAGNOSIS — I10 ESSENTIAL (PRIMARY) HYPERTENSION: ICD-10-CM

## 2024-01-19 DIAGNOSIS — N52.9 ERECTILE DYSFUNCTION, UNSPECIFIED ERECTILE DYSFUNCTION TYPE: ICD-10-CM

## 2024-01-19 DIAGNOSIS — L84 CALLUS OF FOOT: ICD-10-CM

## 2024-01-19 DIAGNOSIS — E78.00 PURE HYPERCHOLESTEROLEMIA, UNSPECIFIED: ICD-10-CM

## 2024-01-19 DIAGNOSIS — Z87.891 PERSONAL HISTORY OF TOBACCO USE: ICD-10-CM

## 2024-01-19 PROCEDURE — G0296 VISIT TO DETERM LDCT ELIG: HCPCS | Performed by: NURSE PRACTITIONER

## 2024-01-19 PROCEDURE — 99214 OFFICE O/P EST MOD 30 MIN: CPT | Performed by: NURSE PRACTITIONER

## 2024-01-19 RX ORDER — AMLODIPINE BESYLATE 10 MG/1
10 TABLET ORAL DAILY
Qty: 90 TABLET | Refills: 1 | Status: SHIPPED | OUTPATIENT
Start: 2024-01-19

## 2024-01-19 RX ORDER — SILDENAFIL 100 MG/1
TABLET, FILM COATED ORAL
Qty: 30 TABLET | Refills: 1 | Status: SHIPPED | OUTPATIENT
Start: 2024-01-19

## 2024-01-19 ASSESSMENT — ENCOUNTER SYMPTOMS
NAUSEA: 0
DIARRHEA: 0
VOMITING: 0
SHORTNESS OF BREATH: 0
CONSTIPATION: 0
ABDOMINAL PAIN: 0
BLOOD IN STOOL: 0
COUGH: 0

## 2024-01-19 ASSESSMENT — PATIENT HEALTH QUESTIONNAIRE - PHQ9
SUM OF ALL RESPONSES TO PHQ QUESTIONS 1-9: 1
SUM OF ALL RESPONSES TO PHQ QUESTIONS 1-9: 1
2. FEELING DOWN, DEPRESSED OR HOPELESS: 1
SUM OF ALL RESPONSES TO PHQ9 QUESTIONS 1 & 2: 1
SUM OF ALL RESPONSES TO PHQ QUESTIONS 1-9: 1
1. LITTLE INTEREST OR PLEASURE IN DOING THINGS: 0
SUM OF ALL RESPONSES TO PHQ QUESTIONS 1-9: 1

## 2024-01-19 NOTE — PATIENT INSTRUCTIONS
follow-up, he or she will help you understand what to do next.  After a lung cancer screening, you can go back to your usual activities right away.  A lung cancer screening test can't tell if you have lung cancer. If your results are positive, your doctor can't tell whether an abnormal finding is a harmless nodule, cancer, or something else without doing more tests.  What can you do to help prevent lung cancer?  Some lung cancers can't be prevented. But if you smoke, quitting smoking is the best step you can take to prevent lung cancer. If you want to quit, your doctor can recommend medicines or other ways to help.  Follow-up care is a key part of your treatment and safety. Be sure to make and go to all appointments, and call your doctor if you are having problems. It's also a good idea to know your test results and keep a list of the medicines you take.  Where can you learn more?  Go to https://www.[employer].net/patientEd and enter Q940 to learn more about \"Learning About Lung Cancer Screening.\"  Current as of: February 28, 2023               Content Version: 13.9  © 2293-8833 [employer].   Care instructions adapted under license by [employer] If you have questions about a medical condition or this instruction, always ask your healthcare professional. [employer] disclaims any warranty or liability for your use of this information.

## 2024-01-19 NOTE — PROGRESS NOTES
Chief Complaint   Patient presents with    7 month follow up       1. \"Have you been to the ER, urgent care clinic since your last visit?  Hospitalized since your last visit?\" Yes Sora[facility]    2. \"Have you seen or consulted any other health care providers outside of the [facility] since your last visit?\" No     3. For patients aged 45-75: Has the patient had a colonoscopy / FIT/ Cologuard? No         The patient, Reynaldo Parks, identity was verified by name and .  Health Maintenance Due   Topic Date Due    Hepatitis B vaccine (1 of 3 - 3-dose series) Never done    COVID-19 Vaccine (1) Never done    Pneumococcal 0-64 years Vaccine (1 - PCV) Never done    Diabetic retinal exam  Never done    Colorectal Cancer Screen  Never done    Shingles vaccine (1 of 2) Never done    Flu vaccine (1) Never done    A1C test (Diabetic or Prediabetic)  2023      
well-developed and well-groomed.   Neck:      Thyroid: No thyromegaly.   Cardiovascular:      Rate and Rhythm: Normal rate and regular rhythm.      Pulses:           Dorsalis pedis pulses are 2+ on the right side and 2+ on the left side.      Heart sounds: Normal heart sounds.   Pulmonary:      Effort: Pulmonary effort is normal.      Breath sounds: Normal breath sounds.   Abdominal:      General: Bowel sounds are normal.      Palpations: Abdomen is soft.      Tenderness: There is no abdominal tenderness.   Musculoskeletal:      Right lower leg: No edema.      Left lower leg: No edema.   Feet:      Right foot:      Skin integrity: Skin integrity normal.      Toenail Condition: Right toenails are abnormally thick.      Left foot:      Skin integrity: Callus present.      Toenail Condition: Left toenails are abnormally thick.   Lymphadenopathy:      Cervical: No cervical adenopathy.   Skin:     General: Skin is warm and dry.   Neurological:      Mental Status: He is alert and oriented to person, place, and time.   Psychiatric:         Attention and Perception: Attention normal.         Mood and Affect: Mood and affect normal.         Speech: Speech normal.         Behavior: Behavior normal. Behavior is cooperative.         Thought Content: Thought content normal.         Cognition and Memory: Cognition normal.         Judgment: Judgment normal.            On this date 1/19/2024 I have spent 35 minutes reviewing previous notes, test results and face to face with the patient discussing the diagnosis and importance of compliance with the treatment plan as well as documenting on the day of the visit.      An electronic signature was used to authenticate this note.    --BEV Hargrove - NP Discussed with the patient the current USPSTF guidelines released March 9, 2021 for screening for lung cancer.    For adults aged 50 to 80 years who have a 20 pack-year smoking history and currently smoke or have quit within the

## 2024-01-20 LAB
ALBUMIN SERPL-MCNC: 4.5 G/DL (ref 3.8–4.9)
ALBUMIN/GLOB SERPL: 2 {RATIO} (ref 1.2–2.2)
ALP SERPL-CCNC: 89 IU/L (ref 44–121)
ALT SERPL-CCNC: 16 IU/L (ref 0–44)
AST SERPL-CCNC: 14 IU/L (ref 0–40)
BILIRUB SERPL-MCNC: 0.4 MG/DL (ref 0–1.2)
BUN SERPL-MCNC: 12 MG/DL (ref 6–24)
BUN/CREAT SERPL: 13 (ref 9–20)
CALCIUM SERPL-MCNC: 12.1 MG/DL (ref 8.7–10.2)
CHLORIDE SERPL-SCNC: 103 MMOL/L (ref 96–106)
CHOLEST SERPL-MCNC: 149 MG/DL (ref 100–199)
CO2 SERPL-SCNC: 27 MMOL/L (ref 20–29)
CREAT SERPL-MCNC: 0.96 MG/DL (ref 0.76–1.27)
EGFRCR SERPLBLD CKD-EPI 2021: 93 ML/MIN/1.73
GLOBULIN SER CALC-MCNC: 2.3 G/DL (ref 1.5–4.5)
GLUCOSE SERPL-MCNC: 61 MG/DL (ref 70–99)
HBA1C MFR BLD: 8.1 % (ref 4.8–5.6)
HDLC SERPL-MCNC: 42 MG/DL
LDLC SERPL CALC-MCNC: 95 MG/DL (ref 0–99)
POTASSIUM SERPL-SCNC: 5.3 MMOL/L (ref 3.5–5.2)
PROT SERPL-MCNC: 6.8 G/DL (ref 6–8.5)
SODIUM SERPL-SCNC: 143 MMOL/L (ref 134–144)
TRIGL SERPL-MCNC: 60 MG/DL (ref 0–149)
VLDLC SERPL CALC-MCNC: 12 MG/DL (ref 5–40)

## 2024-01-24 LAB
ERYTHROCYTE [DISTWIDTH] IN BLOOD BY AUTOMATED COUNT: 15.8 % (ref 11.6–15.4)
HCT VFR BLD AUTO: 48.2 % (ref 37.5–51)
HGB BLD-MCNC: 15.4 G/DL (ref 13–17.7)
IMP & REVIEW OF LAB RESULTS: NORMAL
Lab: NORMAL
MCH RBC QN AUTO: 26.6 PG (ref 26.6–33)
MCHC RBC AUTO-ENTMCNC: 32 G/DL (ref 31.5–35.7)
MCV RBC AUTO: 83 FL (ref 79–97)
PLATELET # BLD AUTO: 407 X10E3/UL (ref 150–450)
RBC # BLD AUTO: 5.78 X10E6/UL (ref 4.14–5.8)
WBC # BLD AUTO: 11 X10E3/UL (ref 3.4–10.8)

## 2024-02-26 ENCOUNTER — HOSPITAL ENCOUNTER (OUTPATIENT)
Facility: HOSPITAL | Age: 54
Discharge: HOME | End: 2024-02-29
Payer: MEDICAID

## 2024-02-26 ENCOUNTER — HOSPITAL ENCOUNTER (OUTPATIENT)
Facility: HOSPITAL | Age: 54
Discharge: HOME | End: 2024-02-28
Payer: MEDICAID

## 2024-02-26 DIAGNOSIS — I73.9 PAD (PERIPHERAL ARTERY DISEASE) (HCC): Primary | ICD-10-CM

## 2024-02-26 DIAGNOSIS — E11.40 TYPE 2 DIABETES MELLITUS WITH DIABETIC NEUROPATHY, WITHOUT LONG-TERM CURRENT USE OF INSULIN (HCC): ICD-10-CM

## 2024-02-26 DIAGNOSIS — Z87.891 PERSONAL HISTORY OF TOBACCO USE: ICD-10-CM

## 2024-02-26 LAB
VAS LEFT ABI: 0.66
VAS LEFT ARM BP: 168 MMHG
VAS LEFT PTA BP: 111 MMHG
VAS LEFT TBI: 0.42
VAS LEFT TOE PRESSURE: 70 MMHG
VAS RIGHT ABI: 0.82
VAS RIGHT ARM BP: 163 MMHG
VAS RIGHT DORSALIS PEDIS BP: 138 MMHG
VAS RIGHT PTA BP: 113 MMHG
VAS RIGHT TBI: 0.61
VAS RIGHT TOE PRESSURE: 102 MMHG

## 2024-02-26 PROCEDURE — 93922 UPR/L XTREMITY ART 2 LEVELS: CPT

## 2024-02-26 PROCEDURE — 71271 CT THORAX LUNG CANCER SCR C-: CPT

## 2024-02-26 PROCEDURE — 93922 UPR/L XTREMITY ART 2 LEVELS: CPT | Performed by: INTERNAL MEDICINE

## 2024-03-04 ENCOUNTER — TELEPHONE (OUTPATIENT)
Age: 54
End: 2024-03-04

## 2024-03-04 NOTE — TELEPHONE ENCOUNTER
Patient contacted and made aware of results. Patient voiced understanding. Patient stated he has not had time to schedule appointment due to him moving he will scheduled appointment ASAP.       ----- Message from BEV Tidwell NP sent at 2/26/2024  7:51 PM EST -----  PHILLIP results show you have moderate peripheral artery disease in the left leg.  It is not at a critical low, but I would like for you to see a vascular specialist for further evaluation and monitoring  You can call and schedule appointment  [facility] - Tonny Benavides MD  [address] [address] [phone]

## 2024-06-14 DIAGNOSIS — E11.40 TYPE 2 DIABETES MELLITUS WITH DIABETIC NEUROPATHY, WITHOUT LONG-TERM CURRENT USE OF INSULIN (HCC): ICD-10-CM

## 2024-07-10 DIAGNOSIS — E11.40 TYPE 2 DIABETES MELLITUS WITH DIABETIC NEUROPATHY, WITHOUT LONG-TERM CURRENT USE OF INSULIN (HCC): ICD-10-CM

## 2024-07-10 DIAGNOSIS — I10 ESSENTIAL (PRIMARY) HYPERTENSION: ICD-10-CM

## 2024-07-12 RX ORDER — GLIPIZIDE 10 MG/1
10 TABLET ORAL 2 TIMES DAILY WITH MEALS
Qty: 180 TABLET | Refills: 1 | Status: SHIPPED | OUTPATIENT
Start: 2024-07-12

## 2024-07-12 RX ORDER — HYDRALAZINE HYDROCHLORIDE 50 MG/1
50 TABLET, FILM COATED ORAL 3 TIMES DAILY
Qty: 270 TABLET | Refills: 1 | Status: SHIPPED | OUTPATIENT
Start: 2024-07-12

## 2024-07-12 RX ORDER — SPIRONOLACTONE 25 MG/1
25 TABLET ORAL DAILY
Qty: 90 TABLET | Refills: 1 | Status: SHIPPED | OUTPATIENT
Start: 2024-07-12

## 2024-07-19 DIAGNOSIS — E11.40 TYPE 2 DIABETES MELLITUS WITH DIABETIC NEUROPATHY, WITHOUT LONG-TERM CURRENT USE OF INSULIN (HCC): ICD-10-CM

## 2024-07-19 NOTE — TELEPHONE ENCOUNTER
Last appointment: 1/19/24  Next appointment: 8/6/24  Previous refill encounter(s): 6/14/24 #120    Requested Prescriptions     Pending Prescriptions Disp Refills    metFORMIN (GLUCOPHAGE) 500 MG tablet [Pharmacy Med Name: metFORMIN HCl 500 MG Oral Tablet] 360 tablet 3     Sig: TAKE 2 TABLETS BY MOUTH TWICE DAILY WITH MEALS         For Pharmacy Admin Tracking Only    Program: Medication Refill  CPA in place:    Recommendation Provided To:   Intervention Detail: New Rx: 1, reason: Patient Preference  Intervention Accepted By:   Gap Closed?:    Time Spent (min): 5

## 2024-07-24 DIAGNOSIS — E11.40 TYPE 2 DIABETES MELLITUS WITH DIABETIC NEUROPATHY, WITHOUT LONG-TERM CURRENT USE OF INSULIN (HCC): ICD-10-CM

## 2024-08-06 ENCOUNTER — OFFICE VISIT (OUTPATIENT)
Age: 54
End: 2024-08-06
Payer: MEDICAID

## 2024-08-06 VITALS
BODY MASS INDEX: 25.84 KG/M2 | OXYGEN SATURATION: 95 % | WEIGHT: 207.8 LBS | HEIGHT: 75 IN | TEMPERATURE: 97.1 F | SYSTOLIC BLOOD PRESSURE: 138 MMHG | RESPIRATION RATE: 20 BRPM | HEART RATE: 84 BPM | DIASTOLIC BLOOD PRESSURE: 70 MMHG

## 2024-08-06 DIAGNOSIS — L30.9 DERMATITIS: ICD-10-CM

## 2024-08-06 DIAGNOSIS — I10 ESSENTIAL (PRIMARY) HYPERTENSION: ICD-10-CM

## 2024-08-06 DIAGNOSIS — R94.31 ABNORMAL EKG: ICD-10-CM

## 2024-08-06 DIAGNOSIS — N52.9 ERECTILE DYSFUNCTION, UNSPECIFIED ERECTILE DYSFUNCTION TYPE: ICD-10-CM

## 2024-08-06 DIAGNOSIS — Z12.5 PROSTATE CANCER SCREENING: ICD-10-CM

## 2024-08-06 DIAGNOSIS — E11.40 TYPE 2 DIABETES MELLITUS WITH DIABETIC NEUROPATHY, WITHOUT LONG-TERM CURRENT USE OF INSULIN (HCC): Primary | ICD-10-CM

## 2024-08-06 DIAGNOSIS — R01.1 SYSTOLIC MURMUR: ICD-10-CM

## 2024-08-06 DIAGNOSIS — E11.40 TYPE 2 DIABETES MELLITUS WITH DIABETIC NEUROPATHY, WITHOUT LONG-TERM CURRENT USE OF INSULIN (HCC): ICD-10-CM

## 2024-08-06 PROBLEM — I73.9 PERIPHERAL VASCULAR DISEASE (HCC): Status: ACTIVE | Noted: 2024-08-06

## 2024-08-06 LAB — HBA1C MFR BLD: 11.6 %

## 2024-08-06 PROCEDURE — PBSHW AMB POC HEMOGLOBIN A1C: Performed by: NURSE PRACTITIONER

## 2024-08-06 PROCEDURE — 83036 HEMOGLOBIN GLYCOSYLATED A1C: CPT | Performed by: NURSE PRACTITIONER

## 2024-08-06 PROCEDURE — 99215 OFFICE O/P EST HI 40 MIN: CPT | Performed by: NURSE PRACTITIONER

## 2024-08-06 PROCEDURE — 3075F SYST BP GE 130 - 139MM HG: CPT | Performed by: NURSE PRACTITIONER

## 2024-08-06 PROCEDURE — 3078F DIAST BP <80 MM HG: CPT | Performed by: NURSE PRACTITIONER

## 2024-08-06 PROCEDURE — 3052F HG A1C>EQUAL 8.0%<EQUAL 9.0%: CPT | Performed by: NURSE PRACTITIONER

## 2024-08-06 RX ORDER — TRIAMCINOLONE ACETONIDE 1 MG/G
CREAM TOPICAL 2 TIMES DAILY
Qty: 15 G | Refills: 1 | Status: SHIPPED | OUTPATIENT
Start: 2024-08-06

## 2024-08-06 RX ORDER — SILDENAFIL 100 MG/1
TABLET, FILM COATED ORAL
Qty: 30 TABLET | Refills: 1 | Status: SHIPPED | OUTPATIENT
Start: 2024-08-06

## 2024-08-06 SDOH — ECONOMIC STABILITY: INCOME INSECURITY: HOW HARD IS IT FOR YOU TO PAY FOR THE VERY BASICS LIKE FOOD, HOUSING, MEDICAL CARE, AND HEATING?: NOT VERY HARD

## 2024-08-06 SDOH — ECONOMIC STABILITY: FOOD INSECURITY: WITHIN THE PAST 12 MONTHS, THE FOOD YOU BOUGHT JUST DIDN'T LAST AND YOU DIDN'T HAVE MONEY TO GET MORE.: NEVER TRUE

## 2024-08-06 SDOH — ECONOMIC STABILITY: FOOD INSECURITY: WITHIN THE PAST 12 MONTHS, YOU WORRIED THAT YOUR FOOD WOULD RUN OUT BEFORE YOU GOT MONEY TO BUY MORE.: NEVER TRUE

## 2024-08-06 ASSESSMENT — PATIENT HEALTH QUESTIONNAIRE - PHQ9
SUM OF ALL RESPONSES TO PHQ QUESTIONS 1-9: 0
1. LITTLE INTEREST OR PLEASURE IN DOING THINGS: NOT AT ALL
SUM OF ALL RESPONSES TO PHQ9 QUESTIONS 1 & 2: 0
SUM OF ALL RESPONSES TO PHQ QUESTIONS 1-9: 0
2. FEELING DOWN, DEPRESSED OR HOPELESS: NOT AT ALL

## 2024-08-06 ASSESSMENT — ENCOUNTER SYMPTOMS
SHORTNESS OF BREATH: 0
DIARRHEA: 0
NAUSEA: 0
ABDOMINAL PAIN: 0
CONSTIPATION: 0
VOMITING: 0
BLOOD IN STOOL: 0
COUGH: 0

## 2024-08-06 NOTE — PATIENT INSTRUCTIONS
Dr. Suzanna Chandler  [facility] building  [address] [place] [address]    Hanane Abbasi NP, Bonnie Woodson NP  [facility]  [address], [address]  [place] [address] [phone]  OR  [facility]  [address] [place] [address] [phone]    Titus Ribeiro NP, Fabiana Hull [facility]  [address] [place] [address] [phone]    Dr. Checo Virk, Dr. Anika Corral, Dr. [facility]  [address], [address]  [place] [address] [phone]    Dr. Renan Hull [facility]  [address], [address]  [place] [address] [phone]    Phoebe Carrasco NP  [facility]  [address], [address]  [place] [address] [phone]

## 2024-08-06 NOTE — PROGRESS NOTES
Chief Complaint   Patient presents with    Diabetes    7 month follow up       \"Have you been to the ER, urgent care clinic since your last visit?  Hospitalized since your last visit?\"    NO    “Have you seen or consulted any other health care providers outside of [facility] since your last visit?”    NO        “Have you had a colorectal cancer screening such as a colonoscopy/FIT/Cologuard?    NO    No colonoscopy on file  No cologuard on file  No FIT/FOBT on file   No flexible sigmoidoscopy on file         Click Here for Release of Records Request       There were no vitals filed for this visit.   Health Maintenance Due   Topic Date Due    Hepatitis B vaccine (1 of 3 - 3-dose series) Never done    Pneumococcal 0-64 years Vaccine (1 of 2 - PCV) Never done    Diabetic retinal exam  Never done    Colorectal Cancer Screen  Never done    Shingles vaccine (1 of 2) Never done    Diabetic foot exam  2024    Diabetic Alb to Cr ratio (uACR) test  2024    Flu vaccine (1) Never done        The patient, Reynaldo Gutierrez, identity was verified by name and .     
   Cancer Father         colon    Breast Cancer Sister     Diabetes Sister     Heart Failure Sister 52    Kidney Disease Sister         CKD stage 4    Hypertension Sister     Heart Attack Sister 35    Stroke Sister 35    Kidney Disease Sister         on dialysis    Diabetes Sister          at age 35y from MI, stroke, kidney failure    Hypertension Sister     Heart Failure Sister        Current Outpatient Medications   Medication Sig    triamcinolone (KENALOG) 0.1 % cream Apply topically 2 times daily    sildenafil (VIAGRA) 100 MG tablet Take 1/2 to 1 tab by mouth 30-60 minutes prior to sex on empty stomach.    canagliflozin (INVOKANA) 100 MG TABS tablet Take 1 tablet by mouth every morning (before breakfast)    metFORMIN (GLUCOPHAGE) 500 MG tablet TAKE 2 TABLETS BY MOUTH TWICE DAILY WITH MEALS    glipiZIDE (GLUCOTROL) 10 MG tablet TAKE 1 TABLET BY MOUTH TWICE DAILY WITH MEALS    spironolactone (ALDACTONE) 25 MG tablet Take 1 tablet by mouth once daily    hydrALAZINE (APRESOLINE) 50 MG tablet TAKE 1 TABLET BY MOUTH THREE TIMES DAILY    amLODIPine (NORVASC) 10 MG tablet Take 1 tablet by mouth daily    atorvastatin (LIPITOR) 40 MG tablet Take 1 tablet by mouth daily    lisinopril (PRINIVIL;ZESTRIL) 40 MG tablet Take 1 tablet by mouth daily    linagliptin (TRADJENTA) 5 MG tablet Take 1 tablet by mouth daily    Lancets MISC MONITOR BG TWICE DAILY  DX E11.8  TYPE 2 DM    aspirin 81 MG EC tablet Take 1 tablet by mouth daily     No current facility-administered medications for this visit.       Review of Systems   Constitutional:  Negative for chills, fever and unexpected weight change.   Respiratory:  Negative for cough and shortness of breath.    Cardiovascular:  Negative for chest pain, palpitations and leg swelling.   Gastrointestinal:  Negative for abdominal pain, blood in stool, constipation, diarrhea, nausea and vomiting.   Genitourinary:  Negative for difficulty urinating, dysuria, flank pain, frequency,

## 2024-08-27 DIAGNOSIS — I10 ESSENTIAL (PRIMARY) HYPERTENSION: ICD-10-CM

## 2024-08-27 NOTE — TELEPHONE ENCOUNTER
Last appointment: 8/6/24  Next appointment: Advised to follow-up 12/6/24  Previous refill encounter(s): 1/19/24 #90 with 1 refill    Requested Prescriptions     Pending Prescriptions Disp Refills    amLODIPine (NORVASC) 10 MG tablet [Pharmacy Med Name: amLODIPine Besylate 10 MG Oral Tablet] 30 tablet 0     Sig: Take 1 tablet by mouth once daily         For Pharmacy Admin Tracking Only    Program: Medication Refill  CPA in place:    Recommendation Provided To:   Intervention Detail: New Rx: 1, reason: Patient Preference  Intervention Accepted By:   Gap Closed?:    Time Spent (min): 5

## 2024-08-28 ENCOUNTER — HOSPITAL ENCOUNTER (OUTPATIENT)
Facility: HOSPITAL | Age: 54
Discharge: HOME | End: 2024-08-31
Attending: PODIATRIST
Payer: MEDICAID

## 2024-08-28 DIAGNOSIS — S90.852S FOREIGN BODY IN LEFT FOOT, SEQUELA: ICD-10-CM

## 2024-08-28 PROCEDURE — 73718 MRI LOWER EXTREMITY W/O DYE: CPT

## 2024-08-28 RX ORDER — AMLODIPINE BESYLATE 10 MG/1
10 TABLET ORAL DAILY
Qty: 30 TABLET | Refills: 0 | Status: SHIPPED | OUTPATIENT
Start: 2024-08-28

## 2024-09-04 ENCOUNTER — HOSPITAL ENCOUNTER (OUTPATIENT)
Facility: HOSPITAL | Age: 54
Discharge: HOME | End: 2024-09-06
Payer: MEDICAID

## 2024-09-04 DIAGNOSIS — I10 ESSENTIAL (PRIMARY) HYPERTENSION: ICD-10-CM

## 2024-09-04 DIAGNOSIS — R01.1 SYSTOLIC MURMUR: ICD-10-CM

## 2024-09-04 LAB
ECHO AO ROOT DIAM: 3.6 CM
ECHO AV AREA PEAK VELOCITY: 2.3 CM2
ECHO AV PEAK GRADIENT: 10 MMHG
ECHO AV PEAK VELOCITY: 1.6 M/S
ECHO AV VELOCITY RATIO: 0.5
ECHO LA DIAMETER: 4.3 CM
ECHO LA TO AORTIC ROOT RATIO: 1.19
ECHO LV EF PHYSICIAN: 55 %
ECHO LV FRACTIONAL SHORTENING: 26 % (ref 28–44)
ECHO LV INTERNAL DIMENSION DIASTOLIC: 5.3 CM (ref 4.2–5.9)
ECHO LV INTERNAL DIMENSION SYSTOLIC: 3.9 CM
ECHO LV IVSD: 1.3 CM (ref 0.6–1)
ECHO LV MASS 2D: 302.7 G (ref 88–224)
ECHO LV POSTERIOR WALL DIASTOLIC: 1.4 CM (ref 0.6–1)
ECHO LV RELATIVE WALL THICKNESS RATIO: 0.53
ECHO LVOT AREA: 4.5 CM2
ECHO LVOT DIAM: 2.4 CM
ECHO LVOT PEAK GRADIENT: 3 MMHG
ECHO LVOT PEAK VELOCITY: 0.8 M/S
ECHO MV A VELOCITY: 0.99 M/S
ECHO MV AREA PHT: 4.3 CM2
ECHO MV E DECELERATION TIME (DT): 177 MS
ECHO MV E VELOCITY: 0.8 M/S
ECHO MV E/A RATIO: 0.81
ECHO MV PRESSURE HALF TIME (PHT): 51.3 MS
ECHO PV MAX VELOCITY: 0.8 M/S
ECHO PV PEAK GRADIENT: 2 MMHG

## 2024-09-04 PROCEDURE — 93306 TTE W/DOPPLER COMPLETE: CPT

## 2024-09-04 PROCEDURE — 93306 TTE W/DOPPLER COMPLETE: CPT | Performed by: SPECIALIST

## 2024-09-05 DIAGNOSIS — E11.40 TYPE 2 DIABETES MELLITUS WITH DIABETIC NEUROPATHY, WITHOUT LONG-TERM CURRENT USE OF INSULIN (HCC): ICD-10-CM

## 2024-09-05 DIAGNOSIS — E78.00 PURE HYPERCHOLESTEROLEMIA, UNSPECIFIED: ICD-10-CM

## 2024-09-05 DIAGNOSIS — I10 ESSENTIAL (PRIMARY) HYPERTENSION: ICD-10-CM

## 2024-09-06 RX ORDER — LISINOPRIL 40 MG/1
40 TABLET ORAL DAILY
Qty: 90 TABLET | Refills: 1 | Status: SHIPPED | OUTPATIENT
Start: 2024-09-06

## 2024-09-06 RX ORDER — ATORVASTATIN CALCIUM 40 MG/1
40 TABLET, FILM COATED ORAL DAILY
Qty: 90 TABLET | Refills: 1 | Status: SHIPPED | OUTPATIENT
Start: 2024-09-06

## 2024-09-06 NOTE — TELEPHONE ENCOUNTER
Last appointment: 8/6/24  Next appointment: Advised to follow-up 12/6/24  Previous refill encounter(s): 7/31/23 Lipitor, 6/7/23 Prinivil    Requested Prescriptions     Pending Prescriptions Disp Refills    lisinopril (PRINIVIL;ZESTRIL) 40 MG tablet [Pharmacy Med Name: Lisinopril 40 MG Oral Tablet] 90 tablet 1     Sig: Take 1 tablet by mouth once daily    atorvastatin (LIPITOR) 40 MG tablet [Pharmacy Med Name: Atorvastatin Calcium 40 MG Oral Tablet] 90 tablet 1     Sig: Take 1 tablet by mouth once daily         For Pharmacy Admin Tracking Only    Program: Medication Refill  CPA in place:    Recommendation Provided To:   Intervention Detail: New Rx: 2, reason: Patient Preference  Intervention Accepted By:   Gap Closed?:    Time Spent (min): 5

## 2024-09-09 ENCOUNTER — TELEPHONE (OUTPATIENT)
Age: 54
End: 2024-09-09

## 2024-10-16 DIAGNOSIS — I10 ESSENTIAL (PRIMARY) HYPERTENSION: ICD-10-CM

## 2024-10-17 RX ORDER — AMLODIPINE BESYLATE 10 MG/1
10 TABLET ORAL DAILY
Qty: 90 TABLET | Refills: 2 | Status: SHIPPED | OUTPATIENT
Start: 2024-10-17

## 2024-10-17 NOTE — TELEPHONE ENCOUNTER
Last appointment: 8/6/24  Next appointment: Advised to follow-up 12/6/24  Previous refill encounter(s): 8/28/24 #30    Requested Prescriptions     Pending Prescriptions Disp Refills    amLODIPine (NORVASC) 10 MG tablet [Pharmacy Med Name: amLODIPine Besylate 10 MG Oral Tablet] 90 tablet 1     Sig: Take 1 tablet by mouth once daily         For Pharmacy Admin Tracking Only    Program: Medication Refill  CPA in place:    Recommendation Provided To:   Intervention Detail: New Rx: 1, reason: Patient Preference  Intervention Accepted By:   Gap Closed?:    Time Spent (min): 5

## 2025-01-14 NOTE — TELEPHONE ENCOUNTER
Last A1c  8.2 (07/11/2024), this is trending down compared to  8.4 (04/11/2024)  CKD? (No; Recent Creatinine: 0.94 or Microalbumin/Creatinine: )  ASCVD 12.8 Prescribed statin? Yes  Most recent LDL: 68 (4/11/2024).  - other complications: neuropathy   - Barriers to treatment adherence:  Availability of insulin  - Medication regimen: Lantus 35 units nightly, NovoLog 25 units with meals  - glucose checks: finds CGM to be helpful. Does have periodic low sugars.     Januvia was previously prescribed, but not covered by insurance.     Diagnosed with diabetes 14 years prior. Father and grandfather with history of DMII.      Last appointment: 1/19/24  Next appointment: 8/6/24  Previous refill encounter(s): 6/7/23    Requested Prescriptions     Pending Prescriptions Disp Refills    metFORMIN (GLUCOPHAGE) 500 MG tablet [Pharmacy Med Name: metFORMIN HCl 500 MG Oral Tablet] 180 tablet 0     Sig: TAKE 2 TABLETS BY MOUTH TWICE DAILY WITH MEALS         For Pharmacy Admin Tracking Only    Program: Medication Refill  CPA in place:    Recommendation Provided To:   Intervention Detail: New Rx: 1, reason: Patient Preference  Intervention Accepted By:   Gap Closed?:    Time Spent (min): 5

## 2025-02-03 ENCOUNTER — OFFICE VISIT (OUTPATIENT)
Age: 55
End: 2025-02-03

## 2025-02-03 VITALS
DIASTOLIC BLOOD PRESSURE: 99 MMHG | SYSTOLIC BLOOD PRESSURE: 175 MMHG | HEART RATE: 76 BPM | RESPIRATION RATE: 18 BRPM | WEIGHT: 210 LBS | BODY MASS INDEX: 26.11 KG/M2 | HEIGHT: 75 IN | TEMPERATURE: 97.6 F | OXYGEN SATURATION: 93 %

## 2025-02-03 DIAGNOSIS — I10 PRIMARY HYPERTENSION: Primary | ICD-10-CM

## 2025-02-03 DIAGNOSIS — E11.40 TYPE 2 DIABETES MELLITUS WITH DIABETIC NEUROPATHY, WITHOUT LONG-TERM CURRENT USE OF INSULIN (HCC): ICD-10-CM

## 2025-02-03 RX ORDER — GLIPIZIDE 10 MG/1
10 TABLET ORAL 2 TIMES DAILY
Qty: 180 TABLET | Refills: 0 | Status: SHIPPED | OUTPATIENT
Start: 2025-02-03

## 2025-02-03 RX ORDER — AMLODIPINE BESYLATE 10 MG/1
10 TABLET ORAL DAILY
Qty: 90 TABLET | Refills: 0 | Status: SHIPPED | OUTPATIENT
Start: 2025-02-03

## 2025-02-03 RX ORDER — SPIRONOLACTONE 25 MG/1
25 TABLET ORAL DAILY
Qty: 90 TABLET | Refills: 0 | Status: SHIPPED | OUTPATIENT
Start: 2025-02-03

## 2025-02-03 NOTE — ASSESSMENT & PLAN NOTE
As above.    Orders:    metFORMIN (GLUCOPHAGE) 500 MG tablet; Take 2 tablets by mouth 2 times daily (with meals)    glipiZIDE (GLUCOTROL) 10 MG tablet; Take 1 tablet by mouth 2 times daily    [facility]

## 2025-02-03 NOTE — ASSESSMENT & PLAN NOTE
Will refill today as below, and see him for long term care at the PCP clinic in about a month.    Orders:    spironolactone (ALDACTONE) 25 MG tablet; Take 1 tablet by mouth daily    amLODIPine (NORVASC) 10 MG tablet; Take 1 tablet by mouth daily    [facility]

## 2025-02-03 NOTE — PROGRESS NOTES
Reynaldo Gutierrez (:  1970) is a 54 y.o. male,New patient, here for evaluation of the following chief complaint(s):  Medication Refill         Assessment & Plan  Primary hypertension   Will refill today as below, and see him for long term care at the PCP clinic in about a month.    Orders:    spironolactone (ALDACTONE) 25 MG tablet; Take 1 tablet by mouth daily    amLODIPine (NORVASC) 10 MG tablet; Take 1 tablet by mouth daily    [facility]    Type 2 diabetes mellitus with diabetic neuropathy, without long-term current use of insulin (HCC)   As above.    Orders:    metFORMIN (GLUCOPHAGE) 500 MG tablet; Take 2 tablets by mouth 2 times daily (with meals)    glipiZIDE (GLUCOTROL) 10 MG tablet; Take 1 tablet by mouth 2 times daily    [facility]      Return in 29 days (on 3/4/2025) for 8am PCP visit with Dr. HODGES with fasting labs.       Subjective   Patient out of refills, lost his PCP. Needs BP and DM2 oral meds filled. No complaints otherwise.       History provided by:  Patient   used: No        Review of Systems   All other systems reviewed and are negative.         Objective   Physical Exam  Constitutional:       General: He is not in acute distress.     Appearance: Normal appearance. He is not ill-appearing.   HENT:      Head: Normocephalic and atraumatic.      Right Ear: External ear normal.      Left Ear: External ear normal.      Nose: Nose normal.   Eyes:      General: No scleral icterus.        Right eye: No discharge.         Left eye: No discharge.      Extraocular Movements: Extraocular movements intact.      Conjunctiva/sclera: Conjunctivae normal.   Cardiovascular:      Rate and Rhythm: Normal rate and regular rhythm.      Pulses: Normal pulses.      Heart sounds: Normal heart sounds. No murmur heard.     No friction rub. No gallop.   Pulmonary:      Effort: Pulmonary effort is normal. No respiratory distress.

## 2025-03-04 ENCOUNTER — OFFICE VISIT (OUTPATIENT)
Facility: CLINIC | Age: 55
End: 2025-03-04
Payer: MEDICAID

## 2025-03-04 VITALS
DIASTOLIC BLOOD PRESSURE: 96 MMHG | BODY MASS INDEX: 26.24 KG/M2 | HEIGHT: 75 IN | HEART RATE: 93 BPM | WEIGHT: 211 LBS | OXYGEN SATURATION: 96 % | SYSTOLIC BLOOD PRESSURE: 173 MMHG | RESPIRATION RATE: 18 BRPM

## 2025-03-04 DIAGNOSIS — E11.40 TYPE 2 DIABETES MELLITUS WITH DIABETIC NEUROPATHY, WITHOUT LONG-TERM CURRENT USE OF INSULIN (HCC): Primary | ICD-10-CM

## 2025-03-04 DIAGNOSIS — I73.9 PERIPHERAL VASCULAR DISEASE: ICD-10-CM

## 2025-03-04 DIAGNOSIS — N52.9 ERECTILE DYSFUNCTION, UNSPECIFIED ERECTILE DYSFUNCTION TYPE: ICD-10-CM

## 2025-03-04 DIAGNOSIS — E78.2 MIXED HYPERLIPIDEMIA: ICD-10-CM

## 2025-03-04 DIAGNOSIS — Z87.891 PERSONAL HISTORY OF TOBACCO USE: ICD-10-CM

## 2025-03-04 DIAGNOSIS — Z12.11 ENCOUNTER FOR SCREENING FOR MALIGNANT NEOPLASM OF COLON: ICD-10-CM

## 2025-03-04 DIAGNOSIS — I10 ESSENTIAL (PRIMARY) HYPERTENSION: ICD-10-CM

## 2025-03-04 PROBLEM — Z72.0 TOBACCO ABUSE: Status: RESOLVED | Noted: 2019-03-13 | Resolved: 2025-03-04

## 2025-03-04 PROBLEM — E83.52 HYPERCALCEMIA: Status: RESOLVED | Noted: 2019-03-13 | Resolved: 2025-03-04

## 2025-03-04 PROBLEM — E55.9 VITAMIN D DEFICIENCY: Status: RESOLVED | Noted: 2017-08-07 | Resolved: 2025-03-04

## 2025-03-04 PROCEDURE — 3080F DIAST BP >= 90 MM HG: CPT

## 2025-03-04 PROCEDURE — 99214 OFFICE O/P EST MOD 30 MIN: CPT

## 2025-03-04 PROCEDURE — 3077F SYST BP >= 140 MM HG: CPT

## 2025-03-04 PROCEDURE — G0296 VISIT TO DETERM LDCT ELIG: HCPCS

## 2025-03-04 RX ORDER — SILDENAFIL 100 MG/1
TABLET, FILM COATED ORAL
Qty: 30 TABLET | Refills: 11 | Status: SHIPPED | OUTPATIENT
Start: 2025-03-04

## 2025-03-04 RX ORDER — LISINOPRIL 40 MG/1
40 TABLET ORAL DAILY
Qty: 90 TABLET | Refills: 3 | Status: SHIPPED | OUTPATIENT
Start: 2025-03-04

## 2025-03-04 RX ORDER — ATORVASTATIN CALCIUM 40 MG/1
40 TABLET, FILM COATED ORAL DAILY
Qty: 90 TABLET | Refills: 3 | Status: SHIPPED | OUTPATIENT
Start: 2025-03-04

## 2025-03-04 SDOH — ECONOMIC STABILITY: FOOD INSECURITY: WITHIN THE PAST 12 MONTHS, YOU WORRIED THAT YOUR FOOD WOULD RUN OUT BEFORE YOU GOT MONEY TO BUY MORE.: NEVER TRUE

## 2025-03-04 SDOH — ECONOMIC STABILITY: FOOD INSECURITY: WITHIN THE PAST 12 MONTHS, THE FOOD YOU BOUGHT JUST DIDN'T LAST AND YOU DIDN'T HAVE MONEY TO GET MORE.: NEVER TRUE

## 2025-03-04 ASSESSMENT — PATIENT HEALTH QUESTIONNAIRE - PHQ9
1. LITTLE INTEREST OR PLEASURE IN DOING THINGS: NOT AT ALL
SUM OF ALL RESPONSES TO PHQ QUESTIONS 1-9: 0
2. FEELING DOWN, DEPRESSED OR HOPELESS: NOT AT ALL
SUM OF ALL RESPONSES TO PHQ QUESTIONS 1-9: 0

## 2025-03-04 NOTE — ASSESSMENT & PLAN NOTE
Refills and labs today.    Orders:    CBC with Auto Differential; Future    Comprehensive Metabolic Panel; Future    Lipid Panel; Future    atorvastatin (LIPITOR) 40 MG tablet; Take 1 tablet by mouth daily    Lipid Panel    Comprehensive Metabolic Panel    CBC with Auto Differential

## 2025-03-04 NOTE — PROGRESS NOTES
Reynaldo Gutierrez (:  1970) is a 54 y.o. male,Established patient, here for evaluation of the following chief complaint(s):  New Patient (Seen at urgent care)         Assessment & Plan  Type 2 diabetes mellitus with diabetic neuropathy, without long-term current use of insulin (HCC)    Overdue for labs, will assess today. Refills as below.    Orders:    CBC with Auto Differential; Future    Comprehensive Metabolic Panel; Future    Hemoglobin A1C; Future    lisinopril (PRINIVIL;ZESTRIL) 40 MG tablet; Take 1 tablet by mouth daily    Albumin/Creatinine Ratio, Urine; Future    linagliptin (TRADJENTA) 5 MG tablet; Take 1 tablet by mouth daily    Albumin/Creatinine Ratio, Urine    Hemoglobin A1C    Comprehensive Metabolic Panel    CBC with Auto Differential    Mixed hyperlipidemia   Refills and labs today.    Orders:    CBC with Auto Differential; Future    Comprehensive Metabolic Panel; Future    Lipid Panel; Future    atorvastatin (LIPITOR) 40 MG tablet; Take 1 tablet by mouth daily    Lipid Panel    Comprehensive Metabolic Panel    CBC with Auto Differential    Essential (primary) hypertension    BP high again, although he admits to skipping meds this morning. Refill of Lisinopril today and checking labs.    Orders:    CBC with Auto Differential; Future    Comprehensive Metabolic Panel; Future    lisinopril (PRINIVIL;ZESTRIL) 40 MG tablet; Take 1 tablet by mouth daily    Comprehensive Metabolic Panel    CBC with Auto Differential    Erectile dysfunction, unspecified erectile dysfunction type    Refills today.    Orders:    sildenafil (VIAGRA) 100 MG tablet; Take 1/2 to 1 tab by mouth 30-60 minutes prior to sex on empty stomach.    Personal history of tobacco use    He is due for repeat LDCT, will order.    Orders:    MS VISIT TO DISCUSS LUNG CA SCREEN W LDCT    CT Lung Screen (Initial/Annual/Baseline); Future    Peripheral vascular disease    Due to this on his hx, I am recommending he stop Invokana and will 
on file  No FIT/FOBT on file   No flexible sigmoidoscopy on file         Click Here for Release of Records Request      --Julien Sorenson CMA       ------------------------------------------------

## 2025-03-04 NOTE — PATIENT INSTRUCTIONS
follow-up, he or she will help you understand what to do next.  After a lung cancer screening, you can go back to your usual activities right away.  A lung cancer screening test can't tell if you have lung cancer. If your results are positive, your doctor can't tell whether an abnormal finding is a harmless nodule, cancer, or something else without doing more tests.  What can you do to help prevent lung cancer?  Some lung cancers can't be prevented. But if you smoke, quitting smoking is the best step you can take to prevent lung cancer. If you want to quit, your doctor can recommend medicines or other ways to help.  Follow-up care is a key part of your treatment and safety. Be sure to make and go to all appointments, and call your doctor if you are having problems. It's also a good idea to know your test results and keep a list of the medicines you take.  Where can you learn more?  Go to https://www.[employer].net/patientEd and enter Q940 to learn more about \"Learning About Lung Cancer Screening.\"  Current as of: October 25, 2023  Content Version: 14.3  © 2024 [employer].   Care instructions adapted under license by [employer]. If you have questions about a medical condition or this instruction, always ask your healthcare professional. [employer] disclaims any warranty or liability for your use of this information.

## 2025-03-04 NOTE — ASSESSMENT & PLAN NOTE
BP high again, although he admits to skipping meds this morning. Refill of Lisinopril today and checking labs.    Orders:    CBC with Auto Differential; Future    Comprehensive Metabolic Panel; Future    lisinopril (PRINIVIL;ZESTRIL) 40 MG tablet; Take 1 tablet by mouth daily    Comprehensive Metabolic Panel    CBC with Auto Differential

## 2025-03-04 NOTE — ASSESSMENT & PLAN NOTE
Due to this on his hx, I am recommending he stop Invokana and will refrain from SGLT-2 in the future.    Orders:    CBC with Auto Differential; Future    Comprehensive Metabolic Panel; Future    Comprehensive Metabolic Panel    CBC with Auto Differential

## 2025-03-04 NOTE — ASSESSMENT & PLAN NOTE
Refills today.    Orders:    sildenafil (VIAGRA) 100 MG tablet; Take 1/2 to 1 tab by mouth 30-60 minutes prior to sex on empty stomach.

## 2025-03-04 NOTE — ASSESSMENT & PLAN NOTE
Overdue for labs, will assess today. Refills as below.    Orders:    CBC with Auto Differential; Future    Comprehensive Metabolic Panel; Future    Hemoglobin A1C; Future    lisinopril (PRINIVIL;ZESTRIL) 40 MG tablet; Take 1 tablet by mouth daily    Albumin/Creatinine Ratio, Urine; Future    linagliptin (TRADJENTA) 5 MG tablet; Take 1 tablet by mouth daily    Albumin/Creatinine Ratio, Urine    Hemoglobin A1C    Comprehensive Metabolic Panel    CBC with Auto Differential

## 2025-03-04 NOTE — ASSESSMENT & PLAN NOTE
He is due for repeat LDCT, will order.    Orders:    [facility] VISIT TO DISCUSS LUNG CA SCREEN W LDCT    CT Lung Screen (Initial/Annual/Baseline); Future

## 2025-03-05 ENCOUNTER — TELEPHONE (OUTPATIENT)
Facility: CLINIC | Age: 55
End: 2025-03-05

## 2025-03-05 DIAGNOSIS — E11.65 TYPE 2 DIABETES MELLITUS WITH HYPERGLYCEMIA, WITHOUT LONG-TERM CURRENT USE OF INSULIN (HCC): Primary | ICD-10-CM

## 2025-03-05 LAB
ALBUMIN SERPL-MCNC: 4.3 G/DL (ref 3.8–4.9)
ALBUMIN/CREAT UR: 531 MG/G CREAT (ref 0–29)
ALP SERPL-CCNC: 94 IU/L (ref 44–121)
ALT SERPL-CCNC: 17 IU/L (ref 0–44)
AST SERPL-CCNC: 17 IU/L (ref 0–40)
BASOPHILS # BLD AUTO: 0.1 X10E3/UL (ref 0–0.2)
BASOPHILS NFR BLD AUTO: 1 %
BILIRUB SERPL-MCNC: 0.2 MG/DL (ref 0–1.2)
BUN SERPL-MCNC: 20 MG/DL (ref 6–24)
BUN/CREAT SERPL: 24 (ref 9–20)
CALCIUM SERPL-MCNC: 11.5 MG/DL (ref 8.7–10.2)
CHLORIDE SERPL-SCNC: 103 MMOL/L (ref 96–106)
CHOLEST SERPL-MCNC: 157 MG/DL (ref 100–199)
CO2 SERPL-SCNC: 21 MMOL/L (ref 20–29)
CREAT SERPL-MCNC: 0.85 MG/DL (ref 0.76–1.27)
CREAT UR-MCNC: 101.7 MG/DL
EGFRCR SERPLBLD CKD-EPI 2021: 102 ML/MIN/1.73
EOSINOPHIL # BLD AUTO: 0.3 X10E3/UL (ref 0–0.4)
EOSINOPHIL NFR BLD AUTO: 3 %
ERYTHROCYTE [DISTWIDTH] IN BLOOD BY AUTOMATED COUNT: 14.9 % (ref 11.6–15.4)
GLOBULIN SER CALC-MCNC: 2.6 G/DL (ref 1.5–4.5)
GLUCOSE SERPL-MCNC: 272 MG/DL (ref 70–99)
HBA1C MFR BLD: 11.9 % (ref 4.8–5.6)
HCT VFR BLD AUTO: 44.9 % (ref 37.5–51)
HDLC SERPL-MCNC: 39 MG/DL
HGB BLD-MCNC: 14.8 G/DL (ref 13–17.7)
IMM GRANULOCYTES # BLD AUTO: 0 X10E3/UL (ref 0–0.1)
IMM GRANULOCYTES NFR BLD AUTO: 0 %
IMP & REVIEW OF LAB RESULTS: NORMAL
IMP & REVIEW OF LAB RESULTS: NORMAL
LDLC SERPL CALC-MCNC: 97 MG/DL (ref 0–99)
LYMPHOCYTES # BLD AUTO: 3.1 X10E3/UL (ref 0.7–3.1)
LYMPHOCYTES NFR BLD AUTO: 26 %
Lab: NORMAL
Lab: NORMAL
MCH RBC QN AUTO: 27.7 PG (ref 26.6–33)
MCHC RBC AUTO-ENTMCNC: 33 G/DL (ref 31.5–35.7)
MCV RBC AUTO: 84 FL (ref 79–97)
MICROALBUMIN UR-MCNC: 539.8 UG/ML
MONOCYTES # BLD AUTO: 0.8 X10E3/UL (ref 0.1–0.9)
MONOCYTES NFR BLD AUTO: 6 %
NEUTROPHILS # BLD AUTO: 7.8 X10E3/UL (ref 1.4–7)
NEUTROPHILS NFR BLD AUTO: 64 %
PLATELET # BLD AUTO: 340 X10E3/UL (ref 150–450)
POTASSIUM SERPL-SCNC: 4.2 MMOL/L (ref 3.5–5.2)
PROT SERPL-MCNC: 6.9 G/DL (ref 6–8.5)
RBC # BLD AUTO: 5.34 X10E6/UL (ref 4.14–5.8)
REPORT: NORMAL
SODIUM SERPL-SCNC: 138 MMOL/L (ref 134–144)
TRIGL SERPL-MCNC: 116 MG/DL (ref 0–149)
VLDLC SERPL CALC-MCNC: 21 MG/DL (ref 5–40)
WBC # BLD AUTO: 12 X10E3/UL (ref 3.4–10.8)

## 2025-03-05 NOTE — TELEPHONE ENCOUNTER
Spoke to pt about lab results and informing them a referral to endocrine has been placed. Pt stated they appreciate it but thinks with the new medications and diet they can decrease A1C levels since they have before with those changes.

## 2025-03-12 ENCOUNTER — TELEPHONE (OUTPATIENT)
Age: 55
End: 2025-03-12

## 2025-03-12 ENCOUNTER — PREP FOR PROCEDURE (OUTPATIENT)
Age: 55
End: 2025-03-12

## 2025-03-12 DIAGNOSIS — Z12.11 SCREENING FOR COLON CANCER: ICD-10-CM

## 2025-03-12 RX ORDER — SODIUM CHLORIDE 9 MG/ML
INJECTION, SOLUTION INTRAVENOUS PRN
Status: CANCELLED | OUTPATIENT
Start: 2025-03-12

## 2025-03-12 RX ORDER — POLYETHYLENE GLYCOL 3350, SODIUM SULFATE ANHYDROUS, SODIUM BICARBONATE, SODIUM CHLORIDE, POTASSIUM CHLORIDE 236; 22.74; 6.74; 5.86; 2.97 G/4L; G/4L; G/4L; G/4L; G/4L
4 POWDER, FOR SOLUTION ORAL ONCE
Qty: 4000 ML | Refills: 0 | Status: SHIPPED | OUTPATIENT
Start: 2025-03-12 | End: 2025-03-12

## 2025-03-12 RX ORDER — SODIUM CHLORIDE 0.9 % (FLUSH) 0.9 %
5-40 SYRINGE (ML) INJECTION PRN
Status: CANCELLED | OUTPATIENT
Start: 2025-03-12

## 2025-03-12 RX ORDER — SODIUM CHLORIDE 0.9 % (FLUSH) 0.9 %
5-40 SYRINGE (ML) INJECTION EVERY 12 HOURS SCHEDULED
Status: CANCELLED | OUTPATIENT
Start: 2025-03-12

## 2025-03-12 RX ORDER — SODIUM CHLORIDE 9 MG/ML
INJECTION, SOLUTION INTRAVENOUS CONTINUOUS
Status: CANCELLED | OUTPATIENT
Start: 2025-03-12

## 2025-03-12 NOTE — TELEPHONE ENCOUNTER
Contacted patient to schedule colonoscopy with Dr. Barbosa on 5/9. Notified patient of arrival time and stated that prep information will be mailed. Patient thanked me for the call.

## 2025-03-24 DIAGNOSIS — E78.2 MIXED HYPERLIPIDEMIA: ICD-10-CM

## 2025-03-24 RX ORDER — ATORVASTATIN CALCIUM 40 MG/1
40 TABLET, FILM COATED ORAL DAILY
Qty: 90 TABLET | Refills: 3 | Status: SHIPPED | OUTPATIENT
Start: 2025-03-24

## 2025-04-11 PROBLEM — Z12.11 SCREENING FOR COLON CANCER: Status: RESOLVED | Noted: 2025-03-12 | Resolved: 2025-04-11

## 2025-04-21 ENCOUNTER — OFFICE VISIT (OUTPATIENT)
Age: 55
End: 2025-04-21
Payer: MEDICAID

## 2025-04-21 VITALS
BODY MASS INDEX: 26.49 KG/M2 | OXYGEN SATURATION: 98 % | HEART RATE: 71 BPM | TEMPERATURE: 97.9 F | SYSTOLIC BLOOD PRESSURE: 132 MMHG | WEIGHT: 213 LBS | HEIGHT: 75 IN | DIASTOLIC BLOOD PRESSURE: 84 MMHG

## 2025-04-21 DIAGNOSIS — E11.65 TYPE 2 DIABETES MELLITUS WITH HYPERGLYCEMIA, WITHOUT LONG-TERM CURRENT USE OF INSULIN (HCC): ICD-10-CM

## 2025-04-21 DIAGNOSIS — E11.65 TYPE 2 DIABETES MELLITUS WITH HYPERGLYCEMIA, WITHOUT LONG-TERM CURRENT USE OF INSULIN (HCC): Primary | ICD-10-CM

## 2025-04-21 DIAGNOSIS — E83.52 HYPERCALCEMIA: ICD-10-CM

## 2025-04-21 DIAGNOSIS — I10 ESSENTIAL (PRIMARY) HYPERTENSION: ICD-10-CM

## 2025-04-21 PROCEDURE — 3079F DIAST BP 80-89 MM HG: CPT | Performed by: INTERNAL MEDICINE

## 2025-04-21 PROCEDURE — 99214 OFFICE O/P EST MOD 30 MIN: CPT | Performed by: INTERNAL MEDICINE

## 2025-04-21 PROCEDURE — 3075F SYST BP GE 130 - 139MM HG: CPT | Performed by: INTERNAL MEDICINE

## 2025-04-21 PROCEDURE — 99204 OFFICE O/P NEW MOD 45 MIN: CPT | Performed by: INTERNAL MEDICINE

## 2025-04-21 RX ORDER — HYDRALAZINE HYDROCHLORIDE 50 MG/1
50 TABLET, FILM COATED ORAL 3 TIMES DAILY
Qty: 270 TABLET | Refills: 1 | OUTPATIENT
Start: 2025-04-21

## 2025-04-21 RX ORDER — PIOGLITAZONE 15 MG/1
15 TABLET ORAL DAILY
Qty: 90 TABLET | Refills: 1 | Status: SHIPPED | OUTPATIENT
Start: 2025-04-21

## 2025-04-21 NOTE — PROGRESS NOTES
Reynaldo Gutierrez is a 54 y.o. male here for   Chief Complaint   Patient presents with    New Patient    Diabetes       1. Have you been to the ER, urgent care clinic since your last visit?  Hospitalized since your last visit? -N/A    2. Have you seen or consulted any other health care providers outside of the [facility] since your last visit?  Include any pap smears or colon screening.-N/A

## 2025-04-21 NOTE — PROGRESS NOTES
Endocrine consultation     CC:   Chief Complaint   Patient presents with    New Patient    Diabetes     PCP:Philippe Stevens DO  Referring provider: Philippe Stevens Do  [id number] [address] [address]    54 y.o. male  has a past medical history of DM (diabetes mellitus) (HCC), HTN (hypertension), Hypercalcemia, Hypertension, Osteomyelitis of left foot (HCC), S/P amputation of lesser toe, left, TIA (transient ischemic attack), Tobacco abuse, and Vitamin D deficiency.    History of Present Illness  Diabetes Mellitus  - The patient was diagnosed with diabetes mellitus in 2008 or 2009.  - Complications have included peripheral vascular disease and cerebrovascular disease, specifically a transient ischemic attack (TIA).  - osteomyelitis of the left foot and subsequent toe amputation.  - The patient has not experienced any ocular complications related to diabetes mellitus and has not undergone any laser treatments or intravitreal injections.  - It has been a considerable duration since the patient last consulted an ophthalmologist.  - There is no history of hospitalization due to diabetes mellitus, nor is there a history of pancreatitis.    Current diabetes medications  - Glipizide 10 mg twice daily  - Metformin 1000 mg twice daily  - Tradjenta (linagliptin) 5 mg daily, although the latter has not been taken consistently due to initial gastrointestinal discomfort.    Dietary habits  - Consumption of Diet Pepsi  - Abstention from regular juice or soda  - Consumption of junk food such as chips, cookies, and crackers.    Hypercalcemia  - The patient has a long-standing history of unexplained hypercalcemia.  - There is no history of sarcoidosis, nephrolithiasis, fractures, or osteoporosis.  - The patient was previously on vitamin D supplementation but has discontinued its use.  - They do not take calcium supplements.    Supplemental information: None    FAMILY HISTORY  - No personal or family history of

## 2025-04-22 ENCOUNTER — PATIENT MESSAGE (OUTPATIENT)
Facility: CLINIC | Age: 55
End: 2025-04-22

## 2025-04-22 DIAGNOSIS — I10 ESSENTIAL (PRIMARY) HYPERTENSION: ICD-10-CM

## 2025-04-22 DIAGNOSIS — E11.40 TYPE 2 DIABETES MELLITUS WITH DIABETIC NEUROPATHY, WITHOUT LONG-TERM CURRENT USE OF INSULIN (HCC): ICD-10-CM

## 2025-04-22 LAB
1,25(OH)2D SERPL-MCNC: 19.5 PG/ML (ref 24.8–81.5)
25(OH)D3+25(OH)D2 SERPL-MCNC: 14.9 NG/ML (ref 30–100)
ACE SERPL-CCNC: 11 U/L (ref 14–82)
BUN SERPL-MCNC: 15 MG/DL (ref 6–24)
BUN/CREAT SERPL: 17 (ref 9–20)
C PEPTIDE SERPL-MCNC: 5.2 NG/ML (ref 1.1–4.4)
CALCIUM SERPL-MCNC: 11.8 MG/DL (ref 8.7–10.2)
CHLORIDE SERPL-SCNC: 104 MMOL/L (ref 96–106)
CO2 SERPL-SCNC: 28 MMOL/L (ref 20–29)
CREAT SERPL-MCNC: 0.89 MG/DL (ref 0.76–1.27)
EGFRCR SERPLBLD CKD-EPI 2021: 101 ML/MIN/1.73
GLUCOSE SERPL-MCNC: 254 MG/DL (ref 70–99)
POTASSIUM SERPL-SCNC: 4.7 MMOL/L (ref 3.5–5.2)
PTH-INTACT SERPL-MCNC: 32 PG/ML (ref 15–65)
SODIUM SERPL-SCNC: 142 MMOL/L (ref 134–144)

## 2025-04-22 RX ORDER — HYDRALAZINE HYDROCHLORIDE 50 MG/1
50 TABLET, FILM COATED ORAL 3 TIMES DAILY
Qty: 270 TABLET | Refills: 1 | Status: SHIPPED | OUTPATIENT
Start: 2025-04-22

## 2025-04-23 ENCOUNTER — RESULTS FOLLOW-UP (OUTPATIENT)
Age: 55
End: 2025-04-23

## 2025-04-23 DIAGNOSIS — E83.52 HYPERCALCEMIA: Primary | ICD-10-CM

## 2025-04-23 LAB
IGA SERPL-MCNC: 157 MG/DL (ref 90–386)
IGG SERPL-MCNC: 796 MG/DL (ref 603–1613)
IGM SERPL-MCNC: 57 MG/DL (ref 20–172)
PROT PATTERN SERPL IFE-IMP: NORMAL

## 2025-04-23 NOTE — RESULT ENCOUNTER NOTE
I spoke with patient.   Significant calcium elevation with low vit d 1,25/ace and normal DARIAN. PTH low end of normal. Differentials include primary hyperparathyroidism vs non pth mediated. Will proceed with checking PTH related peptide to evaluate for malignancy related hypercalcemia. If normal, will purse additional investigations.   I have advised patient to drink 8-10 glasses of water in the meantime to prevent further increase in Ca levels.

## 2025-04-25 ENCOUNTER — LAB (OUTPATIENT)
Age: 55
End: 2025-04-25

## 2025-04-25 DIAGNOSIS — E83.52 HYPERCALCEMIA: ICD-10-CM

## 2025-04-28 ENCOUNTER — OFFICE VISIT (OUTPATIENT)
Age: 55
End: 2025-04-28
Payer: MEDICAID

## 2025-04-28 ENCOUNTER — PATIENT MESSAGE (OUTPATIENT)
Age: 55
End: 2025-04-28

## 2025-04-28 DIAGNOSIS — E11.65 TYPE 2 DIABETES MELLITUS WITH HYPERGLYCEMIA, WITHOUT LONG-TERM CURRENT USE OF INSULIN (HCC): Primary | ICD-10-CM

## 2025-04-28 PROBLEM — Z12.11 SCREENING FOR COLON CANCER: Status: ACTIVE | Noted: 2025-03-12

## 2025-04-28 PROCEDURE — G0108 DIAB MANAGE TRN  PER INDIV: HCPCS

## 2025-04-28 NOTE — TELEPHONE ENCOUNTER
Pt was called and verify that he was given the relion meter and needs rx for strips. RX for strips sent in.

## 2025-04-28 NOTE — PROGRESS NOTES
[facility]  Diabetes Self-Management Education & Support Program    Reason for Referral: DSMES  Referral Source: Jasmyne Albert MD  Services requested: DSMES       ASSESSMENT    From my perspective, the participant would benefit from DSMES specifically related to reducing risks, healthy eating, monitoring, taking medications, physical activity, healthy coping, and problem solving. Will adapt DSMES program to build on participant's skills score, confidence score, and preparedness score as noted in the Diabetes Skills, Confidence, and Preparedness Index.    During the program, we will focus on providing DSMES that specifically addresses participant's interest in reducing risks, healthy eating, monitoring, taking medications, physical activity, healthy coping, and problem solving, as shown by their reported readiness to change.    The participant would be best served by attending weekly group class series.    Diabetes Self-Management Education Follow-up Visit: June 2025       Clinical Presentation  Reynaldo Gutierrez is a 54 y.o.  male referred for diabetes self-management education. Participant has Type 2 DM not on insulin for 11-20 years. Family history positive for diabetes. Patient reports not receiving DSMES services in the past. Most recent A1c value:   Lab Results   Component Value Date/Time    ISH8JADO 11.6 08/06/2024 02:20 PM    LABA1C 11.9 03/04/2025 07:48 AM       Diabetes-related medical history:  Acute complications    Neurological complications  impotence            Diabetes-related medications:  Current dosing: glipiZIDE - 10 MG  metFORMIN - 500 MG  pioglitazone - 15 MG    Blood Pressure Management  lisinopril - 40 MG      Lipid Management  atorvastatin - 40 MG      Clot Prevention  This patient does not have an active medication from one of the medication groupers.    Learning Assessment  Learning objectives Educator assessment (4/28/2025)   Diabetes Disease

## 2025-04-30 ENCOUNTER — TELEPHONE (OUTPATIENT)
Age: 55
End: 2025-04-30

## 2025-04-30 RX ORDER — SODIUM, POTASSIUM,MAG SULFATES 17.5-3.13G
1 SOLUTION, RECONSTITUTED, ORAL ORAL SEE ADMIN INSTRUCTIONS
Qty: 1 KIT | Refills: 0 | Status: CANCELLED | OUTPATIENT
Start: 2025-04-30

## 2025-04-30 NOTE — TELEPHONE ENCOUNTER
----- Message from Dr. Celina Barbosa MD sent at 3/12/2025  1:46 PM EDT -----  Regarding: RE: Orders  golytely  ----- Message -----  From: Sheila Valverde  Sent: 3/12/2025   1:43 PM EDT  To: Celina Barbosa Jr., MD; Jeannette Burns LPN  Subject: Orders                                           Please complete orders for day of surgery 5/9/25.     Thank you!

## 2025-05-04 LAB — PTH RELATED PROT SERPL-SCNC: <2 PMOL/L

## 2025-05-05 ENCOUNTER — RESULTS FOLLOW-UP (OUTPATIENT)
Age: 55
End: 2025-05-05

## 2025-05-05 DIAGNOSIS — E83.52 HYPERCALCEMIA: Primary | ICD-10-CM

## 2025-05-05 NOTE — RESULT ENCOUNTER NOTE
Please inform PTH -related peptide normal, this is a tumor marker.     It seems the high calcium might be from an overactive parathyroid gland, and to check for this I recommend completing parathyroid scan before next visit, ordered.     And repeating BMP (please order) sometime this month.     Let me know if he has any questions.

## 2025-05-05 NOTE — TELEPHONE ENCOUNTER
----- Message from Dr. Jasmyne Albert MD sent at 5/5/2025 11:43 AM EDT -----  Please inform PTH -related peptide normal, this is a tumor marker.     It seems the high calcium might be from an overactive parathyroid gland, and to check for this I recommend completing parathyroid scan before next visit, ordered.     And repeating BMP (please order) sometime this month.     Let me know if he has any questions.

## 2025-05-05 NOTE — TELEPHONE ENCOUNTER
Informed pt of Dr. Albert's note. Pt verbalized understanding with no further questions or concerns at this time. Pt wants to come here to have labs done. [facility] scheduling team number was provided to pt for scan.

## 2025-05-08 ENCOUNTER — ANESTHESIA EVENT (OUTPATIENT)
Facility: HOSPITAL | Age: 55
End: 2025-05-08
Payer: MEDICAID

## 2025-05-08 ASSESSMENT — LIFESTYLE VARIABLES: SMOKING_STATUS: 1

## 2025-05-08 NOTE — ANESTHESIA PRE PROCEDURE
glipiZIDE (GLUCOTROL) 10 MG tablet TAKE 1 TABLET BY MOUTH TWICE DAILY WITH MEALS 7/12/24   Shoshana Le APRN - NP   spironolactone (ALDACTONE) 25 MG tablet Take 1 tablet by mouth once daily 7/12/24   Shoshana Le APRN - NP   Lancets [facility] MONITOR BG TWICE DAILY  DX E11.8  TYPE 2 DM 4/16/19   Automatic Reconciliation, Ar   aspirin 81 MG EC tablet Take 1 tablet by mouth daily 4/21/19   Automatic Reconciliation, Ar       Current medications:    No current facility-administered medications for this encounter.     Current Outpatient Medications   Medication Sig Dispense Refill    blood glucose test strips (RELION GLUCOSE TEST STRIPS) strip 1 each by In Vitro route 3 times daily As needed. 300 each 3    hydrALAZINE (APRESOLINE) 50 MG tablet Take 1 tablet by mouth 3 times daily 270 tablet 1    blood glucose monitor kit and supplies Use as directed to check BG 3 times daily. Dx code: E11.65 1 kit 0    pioglitazone (ACTOS) 15 MG tablet Take 1 tablet by mouth daily 90 tablet 1    empagliflozin (JARDIANCE) 10 MG tablet Take 1 tablet by mouth daily 90 tablet 1    atorvastatin (LIPITOR) 40 MG tablet Take 1 tablet by mouth daily 90 tablet 3    lisinopril (PRINIVIL;ZESTRIL) 40 MG tablet Take 1 tablet by mouth daily 90 tablet 3    sildenafil (VIAGRA) 100 MG tablet Take 1/2 to 1 tab by mouth 30-60 minutes prior to sex on empty stomach. 30 tablet 11    spironolactone (ALDACTONE) 25 MG tablet Take 1 tablet by mouth daily (Patient not taking: Reported on 4/21/2025) 90 tablet 0    amLODIPine (NORVASC) 10 MG tablet Take 1 tablet by mouth daily (Patient not taking: Reported on 4/21/2025) 90 tablet 0    amLODIPine (NORVASC) 10 MG tablet Take 1 tablet by mouth once daily 90 tablet 2    triamcinolone (KENALOG) 0.1 % cream Apply topically 2 times daily 15 g 1    metFORMIN (GLUCOPHAGE) 500 MG tablet TAKE 2 TABLETS BY MOUTH TWICE DAILY WITH MEALS 360 tablet 1    glipiZIDE (GLUCOTROL) 10 MG tablet TAKE 1 TABLET BY MOUTH TWICE DAILY

## 2025-05-09 ENCOUNTER — ANESTHESIA (OUTPATIENT)
Facility: HOSPITAL | Age: 55
End: 2025-05-09
Payer: MEDICAID

## 2025-05-09 ENCOUNTER — HOSPITAL ENCOUNTER (OUTPATIENT)
Facility: HOSPITAL | Age: 55
Setting detail: OUTPATIENT SURGERY
Discharge: HOME | End: 2025-05-09
Attending: SURGERY | Admitting: SURGERY
Payer: MEDICAID

## 2025-05-09 VITALS
DIASTOLIC BLOOD PRESSURE: 84 MMHG | BODY MASS INDEX: 26.73 KG/M2 | TEMPERATURE: 98 F | HEIGHT: 75 IN | OXYGEN SATURATION: 100 % | SYSTOLIC BLOOD PRESSURE: 137 MMHG | RESPIRATION RATE: 17 BRPM | HEART RATE: 77 BPM | WEIGHT: 215 LBS

## 2025-05-09 LAB
GLUCOSE BLD STRIP.AUTO-MCNC: 133 MG/DL (ref 65–117)
SERVICE CMNT-IMP: ABNORMAL

## 2025-05-09 PROCEDURE — 2709999900 HC NON-CHARGEABLE SUPPLY: Performed by: SURGERY

## 2025-05-09 PROCEDURE — 7100000001 HC PACU RECOVERY - ADDTL 15 MIN: Performed by: SURGERY

## 2025-05-09 PROCEDURE — 2580000003 HC RX 258: Performed by: NURSE ANESTHETIST, CERTIFIED REGISTERED

## 2025-05-09 PROCEDURE — 88305 TISSUE EXAM BY PATHOLOGIST: CPT

## 2025-05-09 PROCEDURE — 3600000012 HC SURGERY LEVEL 2 ADDTL 15MIN: Performed by: SURGERY

## 2025-05-09 PROCEDURE — 6360000002 HC RX W HCPCS: Performed by: NURSE ANESTHETIST, CERTIFIED REGISTERED

## 2025-05-09 PROCEDURE — 45385 COLONOSCOPY W/LESION REMOVAL: CPT | Performed by: SURGERY

## 2025-05-09 PROCEDURE — 82962 GLUCOSE BLOOD TEST: CPT

## 2025-05-09 PROCEDURE — 3600000002 HC SURGERY LEVEL 2 BASE: Performed by: SURGERY

## 2025-05-09 PROCEDURE — 3700000001 HC ADD 15 MINUTES (ANESTHESIA): Performed by: SURGERY

## 2025-05-09 PROCEDURE — 3700000000 HC ANESTHESIA ATTENDED CARE: Performed by: SURGERY

## 2025-05-09 PROCEDURE — 7100000000 HC PACU RECOVERY - FIRST 15 MIN: Performed by: SURGERY

## 2025-05-09 RX ORDER — SODIUM CHLORIDE, SODIUM LACTATE, POTASSIUM CHLORIDE, CALCIUM CHLORIDE 600; 310; 30; 20 MG/100ML; MG/100ML; MG/100ML; MG/100ML
INJECTION, SOLUTION INTRAVENOUS
Status: DISCONTINUED | OUTPATIENT
Start: 2025-05-09 | End: 2025-05-09 | Stop reason: SDUPTHER

## 2025-05-09 RX ORDER — LIDOCAINE HYDROCHLORIDE 20 MG/ML
INJECTION, SOLUTION EPIDURAL; INFILTRATION; INTRACAUDAL; PERINEURAL
Status: DISCONTINUED | OUTPATIENT
Start: 2025-05-09 | End: 2025-05-09 | Stop reason: SDUPTHER

## 2025-05-09 RX ADMIN — PROPOFOL 50 MG: 10 INJECTION, EMULSION INTRAVENOUS at 10:17

## 2025-05-09 RX ADMIN — PROPOFOL 50 MG: 10 INJECTION, EMULSION INTRAVENOUS at 10:26

## 2025-05-09 RX ADMIN — PROPOFOL 50 MG: 10 INJECTION, EMULSION INTRAVENOUS at 10:30

## 2025-05-09 RX ADMIN — PROPOFOL 50 MG: 10 INJECTION, EMULSION INTRAVENOUS at 10:22

## 2025-05-09 RX ADMIN — PROPOFOL 50 MG: 10 INJECTION, EMULSION INTRAVENOUS at 10:18

## 2025-05-09 RX ADMIN — SODIUM CHLORIDE, POTASSIUM CHLORIDE, SODIUM LACTATE AND CALCIUM CHLORIDE: 600; 310; 30; 20 INJECTION, SOLUTION INTRAVENOUS at 10:03

## 2025-05-09 RX ADMIN — LIDOCAINE HYDROCHLORIDE 60 MG: 20 INJECTION, SOLUTION EPIDURAL; INFILTRATION; INTRACAUDAL; PERINEURAL at 10:12

## 2025-05-09 RX ADMIN — PROPOFOL 50 MG: 10 INJECTION, EMULSION INTRAVENOUS at 10:14

## 2025-05-09 RX ADMIN — PROPOFOL 50 MG: 10 INJECTION, EMULSION INTRAVENOUS at 10:20

## 2025-05-09 RX ADMIN — PROPOFOL 150 MG: 10 INJECTION, EMULSION INTRAVENOUS at 10:12

## 2025-05-09 ASSESSMENT — PAIN SCALES - GENERAL
PAINLEVEL_OUTOF10: 0
PAINLEVEL_OUTOF10: 0

## 2025-05-09 ASSESSMENT — PAIN - FUNCTIONAL ASSESSMENT: PAIN_FUNCTIONAL_ASSESSMENT: NONE - DENIES PAIN

## 2025-05-09 NOTE — DISCHARGE INSTRUCTIONS
Reynaldo Gutierrez  [id number]  1970    COLONOSCOPY DISCHARGE INSTRUCTIONS    DISCOMFORT:  Redness at IV site- apply warm compress to area; if redness or soreness persist- contact your physician  There may be a slight amount of blood passed from the rectum  Gaseous discomfort- walking, belching will help relieve any discomfort    DIET:   Regular diet, however, remember your colon is empty and a heavy meal will produce gas.  Avoid these foods:  vegetables, fried / greasy foods, carbonated drinks for today  You may not drink alcoholic beverages for at least 12 hours       ACTIVITY:  You may not operate a vehicle for 12 hours  You may not engage in an occupation involving machinery or appliances for rest of today  You may then resume your normal daily activities it is recommended that you spend the remainder of the day resting -  avoid any strenuous activity.  Avoid making any critical decisions for at least 24 hour    CALL M.D.  ANY SIGN OF:   Increasing pain, nausea, vomiting  Abdominal distension (swelling)  New increased bleeding (oral or rectal)  Fever (chills)  Pain in chest area  Bloody discharge from nose or mouth  Shortness of breath     Follow-up Instructions:   Call Dr. Barbosa for any questions or concerns.   Telephone # [phone]  Biopsy results will be available in  5 to 7 days      Impression:  3 polyps removed repeat colonoscopy in 3 years

## 2025-05-09 NOTE — PERIOP NOTE
I have reviewed discharge instructions with the  patient.  The  patient verbalized understanding. All questions addressed at this time. A paper copy of these instructions have been given to the patient to take home.

## 2025-05-09 NOTE — H&P
Reynaldo Gutierrez (:  1970) is a 54 y.o. male,Established patient, here for evaluation of the following chief complaint(s):  New Patient (Seen at urgent care)         Assessment & Plan  Type 2 diabetes mellitus with diabetic neuropathy, without long-term current use of insulin (HCC)   Overdue for labs, will assess today. Refills as below.    Orders:    CBC with Auto Differential; Future    Comprehensive Metabolic Panel; Future    Hemoglobin A1C; Future    lisinopril (PRINIVIL;ZESTRIL) 40 MG tablet; Take 1 tablet by mouth daily    Albumin/Creatinine Ratio, Urine; Future    linagliptin (TRADJENTA) 5 MG tablet; Take 1 tablet by mouth daily    Albumin/Creatinine Ratio, Urine    Hemoglobin A1C    Comprehensive Metabolic Panel    CBC with Auto Differential    Mixed hyperlipidemia   Refills and labs today.    Orders:    CBC with Auto Differential; Future    Comprehensive Metabolic Panel; Future    Lipid Panel; Future    atorvastatin (LIPITOR) 40 MG tablet; Take 1 tablet by mouth daily    Lipid Panel    Comprehensive Metabolic Panel    CBC with Auto Differential    Essential (primary) hypertension   BP high again, although he admits to skipping meds this morning. Refill of Lisinopril today and checking labs.    Orders:    CBC with Auto Differential; Future    Comprehensive Metabolic Panel; Future    lisinopril (PRINIVIL;ZESTRIL) 40 MG tablet; Take 1 tablet by mouth daily    Comprehensive Metabolic Panel    CBC with Auto Differential    Erectile dysfunction, unspecified erectile dysfunction type   Refills today.    Orders:    sildenafil (VIAGRA) 100 MG tablet; Take 1/2 to 1 tab by mouth 30-60 minutes prior to sex on empty stomach.    Personal history of tobacco use   He is due for repeat LDCT, will order.    Orders:    [facility] VISIT TO DISCUSS LUNG CA SCREEN W LDCT    CT Lung Screen (Initial/Annual/Baseline); Future    Peripheral vascular disease   Due to this on his hx, I am recommending he stop Invokana and will

## 2025-05-09 NOTE — ANESTHESIA POSTPROCEDURE EVALUATION
Department of Anesthesiology  Postprocedure Note    Patient: Reynaldo Gutierrez  MRN: [id number]  YOB: 1970  Date of evaluation: 5/9/2025    Procedure Summary       Date: 05/09/25 Room / Location: [facility]  / [facility] OR    Anesthesia Start: 1008 Anesthesia Stop: 1035    Procedure: COLONOSCOPY (Lower GI Region) Diagnosis:       Screening for colon cancer      (Screening for colon cancer [Z12.11])    Surgeons: Celina Barbosa Jr., MD Responsible Provider: Osmin Donis MD    Anesthesia Type: MAC ASA Status: 3            Anesthesia Type: No value filed.    Hector Phase I: Hector Score: 10    Hector Phase II:      Anesthesia Post Evaluation    Patient location during evaluation: PACU  Patient participation: complete - patient participated  Level of consciousness: awake  Airway patency: patent  Nausea & Vomiting: no nausea  Cardiovascular status: hemodynamically stable  Respiratory status: acceptable  Hydration status: stable  Pain management: adequate    No notable events documented.

## 2025-05-09 NOTE — OP NOTE
Colonoscopy Procedure Note      Indications:    Family history of coloretal cancer (screening only)     :  CELINA HAINES JR, MD    Staff: Circulator: Fauzia Dupont RN  Scrub Person First: Jessica Awad  Circulator Assist: Tonny Galan RN     Implants: none    Referring Provider: Philippe Stevens DO    Sedation: MAC    Procedure Details:  After informed consent was obtained with all risks and benefits of procedure explained and preoperative exam completed, the patient was taken to the endoscopy suite and placed in the left lateral decubitus position.  Upon sequential sedation as per above, a digital rectal exam was performed  And was normal.  The Olympus videocolonoscope  was inserted in the rectum and carefully advanced to the cecum, which was identified by the ileocecal valve and appendiceal orifice.  The quality of preparation was excellent.  The colonoscope was slowly withdrawn with careful evaluation between folds. Retroflexion in the rectum was performed and was normal..     Colon Findings:   Rectum: normal  Sigmoid: 2  Pedunculated polyp(s), the largest 5 mm in size;  Descending Colon: normal  Transverse Colon: 1  Pedunculated polyp(s), the largest 5 mm in size;  Ascending Colon: normal  Cecum: normal      Complications:   None; patient tolerated the procedure well.           Impression:    See Postoperative diagnosis above    Recommendations:  -Repeat colonoscopy in 3 years.   Resume normal medication(s).     Interventions:  3 complete polypectomy were performed using hot snare  and the polyps were  retrieved    Complications: None.     Specimen Removed:    ID Type Source Tests Collected by Time Destination   1 : TRANSVERSE COLON POLYP Tissue Colon-Transverse SURGICAL PATHOLOGY Celina Haines Jr., MD 5/9/2025 1025    2 : SIGMOID COLON POLYP Tissue Sigmoid Colon SURGICAL PATHOLOGY Celina Haines Jr., MD 5/9/2025 1030        EBL:  None.    Discharge

## 2025-05-13 ENCOUNTER — RESULTS FOLLOW-UP (OUTPATIENT)
Age: 55
End: 2025-05-13

## 2025-05-13 RX ORDER — HYDRALAZINE HYDROCHLORIDE 50 MG/1
50 TABLET, FILM COATED ORAL 3 TIMES DAILY
Qty: 270 TABLET | Refills: 0 | Status: SHIPPED | OUTPATIENT
Start: 2025-05-13

## 2025-05-13 RX ORDER — SPIRONOLACTONE 25 MG/1
25 TABLET ORAL DAILY
Qty: 90 TABLET | Refills: 0 | Status: SHIPPED | OUTPATIENT
Start: 2025-05-13

## 2025-05-13 RX ORDER — GLIPIZIDE 10 MG/1
10 TABLET ORAL 2 TIMES DAILY WITH MEALS
Qty: 180 TABLET | Refills: 0 | Status: SHIPPED | OUTPATIENT
Start: 2025-05-13

## 2025-05-13 RX ORDER — AMLODIPINE BESYLATE 10 MG/1
10 TABLET ORAL DAILY
Qty: 90 TABLET | Refills: 0 | Status: SHIPPED | OUTPATIENT
Start: 2025-05-13

## 2025-05-13 NOTE — TELEPHONE ENCOUNTER
Called patient, two patient identifiers used for patient verification, name and .    Patient advised of results and recommendation to repeat colonoscopy in 3 years.  Patient voiced thanks and understanding.

## 2025-05-13 NOTE — TELEPHONE ENCOUNTER
----- Message from Dr. Celina Barbosa MD sent at 5/12/2025  5:23 PM EDT -----  Let him know polyps were benign, repeat in 3 years  ----- Message -----  From: [facility] Incoming Lab For Copath Pdfs  Sent: 5/12/2025  11:34 AM EDT  To: Celina Barbosa Jr., MD

## 2025-05-14 ENCOUNTER — LAB (OUTPATIENT)
Age: 55
End: 2025-05-14

## 2025-05-14 DIAGNOSIS — E83.52 HYPERCALCEMIA: ICD-10-CM

## 2025-05-15 ENCOUNTER — RESULTS FOLLOW-UP (OUTPATIENT)
Age: 55
End: 2025-05-15

## 2025-05-15 LAB
BUN SERPL-MCNC: 12 MG/DL (ref 6–24)
BUN/CREAT SERPL: 12 (ref 9–20)
CALCIUM SERPL-MCNC: 11.6 MG/DL (ref 8.7–10.2)
CHLORIDE SERPL-SCNC: 104 MMOL/L (ref 96–106)
CO2 SERPL-SCNC: 25 MMOL/L (ref 20–29)
CREAT SERPL-MCNC: 1.04 MG/DL (ref 0.76–1.27)
EGFRCR SERPLBLD CKD-EPI 2021: 84 ML/MIN/1.73
GLUCOSE SERPL-MCNC: 153 MG/DL (ref 70–99)
POTASSIUM SERPL-SCNC: 5.1 MMOL/L (ref 3.5–5.2)
SODIUM SERPL-SCNC: 141 MMOL/L (ref 134–144)

## 2025-05-15 NOTE — TELEPHONE ENCOUNTER
Informed pt of Dr. Albert's note. Pt verbalized understanding with no further questions or concerns at this time.

## 2025-05-15 NOTE — TELEPHONE ENCOUNTER
----- Message from Dr. Jasmyne Albert MD sent at 5/15/2025  1:25 PM EDT -----  Calcium remains high - complete parathyroid scan   Must keep 6/9 appointment  Drink at least 8 glasses of water per day

## 2025-05-28 ENCOUNTER — HOSPITAL ENCOUNTER (OUTPATIENT)
Facility: HOSPITAL | Age: 55
Discharge: HOME | End: 2025-05-31
Attending: INTERNAL MEDICINE
Payer: MEDICAID

## 2025-05-28 DIAGNOSIS — E83.52 HYPERCALCEMIA: ICD-10-CM

## 2025-05-28 PROCEDURE — 3430000000 HC RX DIAGNOSTIC RADIOPHARMACEUTICAL: Performed by: INTERNAL MEDICINE

## 2025-05-28 PROCEDURE — 78070 PARATHYROID PLANAR IMAGING: CPT

## 2025-05-28 PROCEDURE — A9500 TC99M SESTAMIBI: HCPCS | Performed by: INTERNAL MEDICINE

## 2025-05-28 RX ORDER — TETRAKIS(2-METHOXYISOBUTYLISOCYANIDE)COPPER(I) TETRAFLUOROBORATE 1 MG/ML
21.6 INJECTION, POWDER, LYOPHILIZED, FOR SOLUTION INTRAVENOUS
Status: COMPLETED | OUTPATIENT
Start: 2025-05-28 | End: 2025-05-28

## 2025-05-28 RX ADMIN — TETRAKIS(2-METHOXYISOBUTYLISOCYANIDE)COPPER(I) TETRAFLUOROBORATE 21.6 MILLICURIE: 1 INJECTION, POWDER, LYOPHILIZED, FOR SOLUTION INTRAVENOUS at 08:10

## 2025-06-03 ENCOUNTER — CLINICAL SUPPORT (OUTPATIENT)
Age: 55
End: 2025-06-03
Payer: MEDICAID

## 2025-06-03 DIAGNOSIS — E11.65 TYPE 2 DIABETES MELLITUS WITH HYPERGLYCEMIA, WITHOUT LONG-TERM CURRENT USE OF INSULIN (HCC): Primary | ICD-10-CM

## 2025-06-03 PROCEDURE — G0109 DIAB MANAGE TRN IND/GROUP: HCPCS

## 2025-06-03 NOTE — PROGRESS NOTES
[facility]  Diabetes Self-Management Education & Support Program  Encounter Note      SUMMARY  Diabetes self-care management training was completed related to reducing risks. The participant will return on Soraida 10 to continue DSMES related to healthy eating and monitoring. The participant did identify SMART Goal(s) and will practice knowledge and skills related to reducing risks to improve diabetes self-management.        EVALUATION:  Participant shared with class that he has had diabetes for about 15 years.  His states that his A1c is 11.  He wants to learn to \"eat to live.\"    Mr. Gutierrez stated that he does not take COVID or flu vaccines.  He is up to date on Hepatitis and TDAP vaccines.  He had diabetic eye exam approximately 2 years ago.  Needs annual dental exam.  He is up to date on annual foot exam and goes every 4-6 months.      RECOMMENDATIONS:  Personal Care Record     TOPICS DISCUSSED TODAY:  WHAT IS DIABETES? Minutes: 60  HOW DO I STAY HEALTHY? 60      Next provider visit is scheduled for   Future Appointments         Provider Specialty Dept Phone    6/4/2025 7:15 AM Philippe Stevens DO Family Medicine [phone]    6/9/2025 9:45 AM Jasmyne Albert MD Endocrinology [phone]    6/10/2025 2:00 PM DIABETES GROUP CLASS [facility] [phone]    6/17/2025 2:00 PM DIABETES GROUP CLASS [facility] [phone]    6/24/2025 2:00 PM DIABETES GROUP CLASS [facility] [phone]                SMART GOAL(S)   Schedule eye exam prior to next education session.  ACHIEVEMENT OF GOAL(S) : 0-24%    2.    Monitor fasting blood glucose 4 days out the next 7 days.    ACHIEVEMENT OF GOAL(S) :  0-24%         DATE DSMES TOPIC EVALUATION     6/3/2025 WHAT IS DIABETES?   Role of the normal pancreas in energy balance and blood glucose control   The defect seen in diabetes   Signs & symptoms of diabetes   Diagnosis of diabetes   Types of

## 2025-06-04 ENCOUNTER — OFFICE VISIT (OUTPATIENT)
Facility: CLINIC | Age: 55
End: 2025-06-04
Payer: MEDICAID

## 2025-06-04 VITALS
BODY MASS INDEX: 25.36 KG/M2 | TEMPERATURE: 97.9 F | HEIGHT: 75 IN | HEART RATE: 72 BPM | RESPIRATION RATE: 19 BRPM | OXYGEN SATURATION: 98 % | SYSTOLIC BLOOD PRESSURE: 142 MMHG | DIASTOLIC BLOOD PRESSURE: 82 MMHG | WEIGHT: 204 LBS

## 2025-06-04 DIAGNOSIS — L84 CALLUS OF FOOT: ICD-10-CM

## 2025-06-04 DIAGNOSIS — E11.65 TYPE 2 DIABETES MELLITUS WITH HYPERGLYCEMIA, WITHOUT LONG-TERM CURRENT USE OF INSULIN (HCC): Primary | ICD-10-CM

## 2025-06-04 PROBLEM — Z79.4 TYPE 2 DIABETES MELLITUS WITH HYPERGLYCEMIA, WITH LONG-TERM CURRENT USE OF INSULIN (HCC): Status: ACTIVE | Noted: 2025-04-21

## 2025-06-04 PROCEDURE — 99213 OFFICE O/P EST LOW 20 MIN: CPT

## 2025-06-04 PROCEDURE — 3079F DIAST BP 80-89 MM HG: CPT

## 2025-06-04 PROCEDURE — 3046F HEMOGLOBIN A1C LEVEL >9.0%: CPT

## 2025-06-04 PROCEDURE — 3077F SYST BP >= 140 MM HG: CPT

## 2025-06-04 NOTE — PROGRESS NOTES
Chief Complaint   Patient presents with    Follow-up    Lab Collection    Diabetes     Patient presents in the office today for a f/u and labs.  Patient stated he does not check his blood sugars.  No other concerns.     Have you been to the ER, urgent care clinic since your last visit?  Hospitalized since your last visit?   NO    Have you seen or consulted any other health care providers outside our system since your last visit?   NO    “Have you had a diabetic eye exam?”    NO     No diabetic eye exam on file

## 2025-06-04 NOTE — PROGRESS NOTES
Reynaldo Gutierrez (:  1970) is a 54 y.o. male,Established patient, here for evaluation of the following chief complaint(s):  Follow-up, Lab Collection, and Diabetes         Assessment & Plan  Type 2 diabetes mellitus with hyperglycemia, without long-term current use of insulin (HCC)  Patient now under the care of endocrinology.  He understands the plan and is adherent to recent med changes.  Just started diabetic education - first class yesterday.  He is encouraged to cont with endo and keep up good work so far.  Will defer labs to them since he goes in next week.   Referral to Podiatry today for routine diabetic foot care.    Orders:    Maria C Veálzquez DPM, [facility] ([address]    Callus of foot       Orders:    Maria C Velázquez DPM, [facility] ([address]      Return in about 6 months (around 2025) for chronic checkup.       Subjective   Patient presents in the office today for a f/u and labs.  Patient stated he does not check his blood sugars.  No other concerns.         Review of Systems   All other systems reviewed and are negative.         Objective   Physical Exam  Constitutional:       General: He is not in acute distress.     Appearance: Normal appearance. He is not ill-appearing.   HENT:      Head: Normocephalic and atraumatic.      Right Ear: External ear normal.      Left Ear: External ear normal.      Nose: Nose normal.   Eyes:      General: No scleral icterus.        Right eye: No discharge.         Left eye: No discharge.      Extraocular Movements: Extraocular movements intact.      Conjunctiva/sclera: Conjunctivae normal.   Cardiovascular:      Rate and Rhythm: Normal rate and regular rhythm.      Pulses: Normal pulses.      Heart sounds: Normal heart sounds. No murmur heard.     No friction rub. No gallop.   Pulmonary:      Effort: Pulmonary effort is normal. No respiratory distress.      Breath sounds: Normal breath sounds. No stridor. No wheezing,

## 2025-06-04 NOTE — ASSESSMENT & PLAN NOTE
Patient now under the care of endocrinology.  He understands the plan and is adherent to recent med changes.  Just started diabetic education - first class yesterday.  He is encouraged to cont with endo and keep up good work so far.  Will defer labs to them since he goes in next week.   Referral to Podiatry today for routine diabetic foot care.    Orders:    LAURIE - Maria C Perez DPM, Podiatry, [facility] ([address]

## 2025-06-08 PROBLEM — Z12.11 SCREENING FOR COLON CANCER: Status: RESOLVED | Noted: 2025-03-12 | Resolved: 2025-06-08

## 2025-06-09 ENCOUNTER — OFFICE VISIT (OUTPATIENT)
Age: 55
End: 2025-06-09
Payer: MEDICAID

## 2025-06-09 VITALS
BODY MASS INDEX: 26.61 KG/M2 | DIASTOLIC BLOOD PRESSURE: 88 MMHG | WEIGHT: 214 LBS | SYSTOLIC BLOOD PRESSURE: 139 MMHG | HEART RATE: 74 BPM | OXYGEN SATURATION: 98 % | HEIGHT: 75 IN | TEMPERATURE: 98 F

## 2025-06-09 DIAGNOSIS — E78.2 MIXED HYPERLIPIDEMIA: ICD-10-CM

## 2025-06-09 DIAGNOSIS — I10 ESSENTIAL (PRIMARY) HYPERTENSION: ICD-10-CM

## 2025-06-09 DIAGNOSIS — Z86.73 HISTORY OF TIA (TRANSIENT ISCHEMIC ATTACK): ICD-10-CM

## 2025-06-09 DIAGNOSIS — E11.40 TYPE 2 DIABETES MELLITUS WITH DIABETIC NEUROPATHY, WITHOUT LONG-TERM CURRENT USE OF INSULIN (HCC): Primary | ICD-10-CM

## 2025-06-09 DIAGNOSIS — E83.52 HYPERCALCEMIA: ICD-10-CM

## 2025-06-09 DIAGNOSIS — Z89.422: ICD-10-CM

## 2025-06-09 DIAGNOSIS — I73.9 PERIPHERAL VASCULAR DISEASE: ICD-10-CM

## 2025-06-09 PROCEDURE — 99214 OFFICE O/P EST MOD 30 MIN: CPT | Performed by: INTERNAL MEDICINE

## 2025-06-09 PROCEDURE — 3046F HEMOGLOBIN A1C LEVEL >9.0%: CPT | Performed by: INTERNAL MEDICINE

## 2025-06-09 PROCEDURE — 3079F DIAST BP 80-89 MM HG: CPT | Performed by: INTERNAL MEDICINE

## 2025-06-09 PROCEDURE — 3075F SYST BP GE 130 - 139MM HG: CPT | Performed by: INTERNAL MEDICINE

## 2025-06-09 RX ORDER — ACYCLOVIR 400 MG/1
TABLET ORAL
Qty: 3 EACH | Refills: 12 | Status: SHIPPED | OUTPATIENT
Start: 2025-06-09

## 2025-06-09 NOTE — PROGRESS NOTES
Reynaldo Gutierrez is a 54 y.o. male here for   Chief Complaint   Patient presents with    Diabetes    Hypercalcemia       1. Have you been to the ER, urgent care clinic since your last visit?  Hospitalized since your last visit? -no    2. Have you seen or consulted any other health care providers outside of the [facility] since your last visit?  Include any pap smears or colon screening.-no      
  54M  has a past medical history of DM (diabetes mellitus) ([facility]), HTN (hypertension), Hypercalcemia, Hypertension, Osteomyelitis of left foot (HCC), S/P amputation of lesser toe, left, TIA (transient ischemic attack), Tobacco abuse, and Vitamin D deficiency. Here for follow up of diabetes and hypercalcemia.   Assessment & Plan    1. Type 2 diabetes mellitus with diabetic neuropathy, without long-term current use of insulin ([facility])  - Last hemoglobin A1c was 11.9% on 03/14/2025.  - cpeptide 5.2 4/2025   - Currently on glipizide 10 mg twice a day, Jardiance 10 mg a day, metformin 1000 mg twice a day, and Actos 15 mg a day. Tradjenta has been stopped.  - Advised to continue current medication regimen without changes. Start checking blood glucose levels before breakfast and bedtime. Repeat hemoglobin A1c test before the next visit. Inform if blood glucose levels are <70 or persistently >250 before the next visit. Follow the rule of 15 if blood glucose levels drop below 70.  - Prescription for a continuous glucose monitor will be provided, and insurance approval will be checked.  -goal HbA1c is 7% without frequent lows  -goal BS is   -to call if BS is <70 or >300  -bring log and meter to each visit  -I reviewed pertinent lab data, provider notes, imaging and reports in care-everywhere   -discussed diet modifications and to receive 150 min of exercise if possible per week of moderate intensity  -discussed treatment for hypoglycemia with glucose tabs and/or 4oz of juice and wait 15 min to recheck BS and to repeat until BS>80  -counseled patient on long term disease complications     Diabetes Microvascular Screening  Retinopathy - last exam overdue, no known DR, no hx of laser treatment or injections   Nephropathy - urine/microalb positive, on ACEI for renal protection   Neuropathy - see below, following w/vascular+podiatry for hx of PVD+toe amptuation      2. Hypercalcemia  - Calcium levels have been elevated for

## 2025-06-10 ENCOUNTER — RESULTS FOLLOW-UP (OUTPATIENT)
Age: 55
End: 2025-06-10

## 2025-06-10 DIAGNOSIS — E83.52 HYPERCALCEMIA: Primary | ICD-10-CM

## 2025-06-10 DIAGNOSIS — R77.8 ABNORMAL SPEP: ICD-10-CM

## 2025-06-10 LAB — HBA1C MFR BLD: 10.4 % (ref 4.8–5.6)

## 2025-06-11 LAB
ALBUMIN MFR UR ELPH: 79.4 %
ALBUMIN SERPL ELPH-MCNC: 3.8 G/DL (ref 2.9–4.4)
ALBUMIN/GLOB SERPL: 1.3 {RATIO} (ref 0.7–1.7)
ALPHA1 GLOB MFR UR ELPH: 2.5 %
ALPHA1 GLOB SERPL ELPH-MCNC: 0.2 G/DL (ref 0–0.4)
ALPHA2 GLOB MFR UR ELPH: 5.9 %
ALPHA2 GLOB SERPL ELPH-MCNC: 0.8 G/DL (ref 0.4–1)
B-GLOBULIN MFR UR ELPH: 8.6 %
B-GLOBULIN SERPL ELPH-MCNC: 1.2 G/DL (ref 0.7–1.3)
GAMMA GLOB MFR UR ELPH: 3.7 %
GAMMA GLOB SERPL ELPH-MCNC: 0.8 G/DL (ref 0.4–1.8)
GLOBULIN SER-MCNC: 3 G/DL (ref 2.2–3.9)
IGA SERPL-MCNC: 142 MG/DL (ref 90–386)
IGG SERPL-MCNC: 818 MG/DL (ref 603–1613)
IGM SERPL-MCNC: 52 MG/DL (ref 20–172)
INTERPRETATION SERPL IEP-IMP: ABNORMAL
KAPPA LC FREE SER-MCNC: 22 MG/L (ref 3.3–19.4)
KAPPA LC FREE/LAMBDA FREE SER: 1.46 {RATIO} (ref 0.26–1.65)
LABORATORY COMMENT REPORT: ABNORMAL
LABORATORY COMMENT REPORT: NORMAL
LAMBDA LC FREE SERPL-MCNC: 15.1 MG/L (ref 5.7–26.3)
M PROTEIN MFR UR ELPH: NORMAL %
M PROTEIN SERPL ELPH-MCNC: ABNORMAL G/DL
PROT SERPL-MCNC: 6.8 G/DL (ref 6–8.5)
PROT UR-MCNC: 37.1 MG/DL

## 2025-06-17 ENCOUNTER — CLINICAL SUPPORT (OUTPATIENT)
Age: 55
End: 2025-06-17
Payer: MEDICAID

## 2025-06-17 DIAGNOSIS — E11.65 TYPE 2 DIABETES MELLITUS WITH HYPERGLYCEMIA, WITHOUT LONG-TERM CURRENT USE OF INSULIN (HCC): Primary | ICD-10-CM

## 2025-06-17 PROCEDURE — G0109 DIAB MANAGE TRN IND/GROUP: HCPCS | Performed by: DIETITIAN, REGISTERED

## 2025-06-17 NOTE — PROGRESS NOTES
[facility]  Diabetes Self-Management Education & Support Program  Encounter Note      SUMMARY  Diabetes self-care management training was completed related to taking medications and physical activity. The participant will return on June 24 to continue DSMES related to healthy coping and problem solving. The participant did identify SMART Goal(s) and will practice knowledge and skills related to reducing risks, healthy eating, monitoring, being active, and medications to improve diabetes self-management.        EVALUATION:  Mr. Gutierrez shared that he used to experience side effects from Ozempic. He was taken off a form of Metform XR 2/2 risk of CA and he has refused some medication options 2/2 potential side effects. He shared that he sometimes has difficulty with wanting to take all of the medications he is on but knows that he needs them to control his medical conditions. He denies experiencing side effects from current medication care plan and he takes his medications consistently to achieve the full benefit. He is struggling with accepting the progressive of diabetes and his potential need for insulin. He shared that he is using a diet coke can at his bedside to dispose of his lancets. He was encouraged to use a non see through bottle with screw top to dispose of lancets appropriately.   He recently joined [employer]. Upon the adivce of his doctor, his is walking on the treadmill for 60 minutes,3 times per week. He shared that is his fear of insulin and loss of limb (2/2 increase risk with PVD) is motivating him to exercise. He has noticed increase energy during the day since he started walking. He participated in group exercise activities. Mr. Gutierrez wants to start adding weights  and balance activities to his weekly exercise routines.     RECOMMENDATIONS:  Communicate with PCT any concerns about side effects from medications  Aim for 150 min of moderate to intense aerobic activity

## 2025-08-08 ENCOUNTER — TRANSCRIBE ORDERS (OUTPATIENT)
Facility: HOSPITAL | Age: 55
End: 2025-08-08

## 2025-08-08 DIAGNOSIS — S91.332A: Primary | ICD-10-CM

## 2025-08-14 DIAGNOSIS — I10 ESSENTIAL (PRIMARY) HYPERTENSION: ICD-10-CM

## 2025-08-14 DIAGNOSIS — E11.40 TYPE 2 DIABETES MELLITUS WITH DIABETIC NEUROPATHY, WITHOUT LONG-TERM CURRENT USE OF INSULIN (HCC): ICD-10-CM

## 2025-08-14 RX ORDER — GLIPIZIDE 10 MG/1
10 TABLET ORAL 2 TIMES DAILY WITH MEALS
Qty: 180 TABLET | Refills: 3 | Status: SHIPPED | OUTPATIENT
Start: 2025-08-14

## 2025-08-14 RX ORDER — SPIRONOLACTONE 25 MG/1
25 TABLET ORAL DAILY
Qty: 90 TABLET | Refills: 3 | Status: SHIPPED | OUTPATIENT
Start: 2025-08-14

## 2025-08-19 ENCOUNTER — HOSPITAL ENCOUNTER (OUTPATIENT)
Facility: HOSPITAL | Age: 55
Discharge: HOME | End: 2025-08-22
Attending: PODIATRIST
Payer: MEDICAID

## 2025-08-19 DIAGNOSIS — S91.332A: ICD-10-CM

## 2025-08-19 PROCEDURE — 73720 MRI LWR EXTREMITY W/O&W/DYE: CPT

## 2025-08-19 PROCEDURE — 2500000003 HC RX 250 WO HCPCS: Performed by: PODIATRIST

## 2025-08-19 PROCEDURE — A9575 INJ GADOTERATE MEGLUMI 0.1ML: HCPCS | Performed by: PODIATRIST

## 2025-08-19 RX ORDER — GADOTERATE MEGLUMINE 376.9 MG/ML
20 INJECTION INTRAVENOUS ONCE
Status: COMPLETED | OUTPATIENT
Start: 2025-08-19 | End: 2025-08-19

## 2025-08-19 RX ADMIN — GADOTERATE MEGLUMINE 20 ML: 376.9 INJECTION INTRAVENOUS at 13:44

## (undated) DEVICE — HOOK LOCK LATEX FREE ELASTIC BANDAGE 4INX5YD

## (undated) DEVICE — TRAP SURG QUAD PARABOLA SLOT DSGN SFTY SCRN TRAPEASE

## (undated) DEVICE — SWAB CULT LIQ STUART AGR AERB MOD IN BRK SGL RAYON TIP PLAS 220099] BECTON DICKINSON MICRO]

## (undated) DEVICE — CULTURETTE SGL EVAC TUBE PALL -- 100/CA

## (undated) DEVICE — SUTURE ETHLN SZ 4-0 L18IN NONABSORBABLE BLK L19MM PS-2 3/8 1667H

## (undated) DEVICE — TRAY PREP DRY W/ PREM GLV 2 APPL 6 SPNG 2 UNDPD 1 OVERWRAP

## (undated) DEVICE — TUBING IRRIG L10IN DISP PMP ENDOGATOR E

## (undated) DEVICE — LIGHT HANDLE: Brand: DEVON

## (undated) DEVICE — PRECISION THIN (9.0 X 0.38 X 31.0MM)

## (undated) DEVICE — BANDAGE COMPR 9 FTX4 IN SMOOTH COMFORTABLE SYNTH ESMRK LF

## (undated) DEVICE — GAUZE SPONGES,12 PLY: Brand: CURITY

## (undated) DEVICE — COLON KIT WITH 1.1 OZ ORCA HYDRA SEAL 2 GOWN

## (undated) DEVICE — STOCKINETTE TUBE BLN 2PLY 6X72 -- MEDICHOICE CONVERT TO 363488

## (undated) DEVICE — SYR IRR BLB 2OZ DISP BLU STRL -- CONVERT TO ITEM 357637

## (undated) DEVICE — SUT ETHLN 3-0 18IN PS2 BLK --

## (undated) DEVICE — Z DISCONTINUEDSOLUTION PREP 2OZ 10% POVIDONE IOD SCR CAP BTL

## (undated) DEVICE — REM POLYHESIVE ADULT PATIENT RETURN ELECTRODE: Brand: VALLEYLAB

## (undated) DEVICE — KERLIX BANDAGE ROLL: Brand: KERLIX

## (undated) DEVICE — STERILE POLYISOPRENE POWDER-FREE SURGICAL GLOVES: Brand: PROTEXIS

## (undated) DEVICE — SYR 10ML LUER LOK 1/5ML GRAD --

## (undated) DEVICE — ZIMMER® STERILE DISPOSABLE TOURNIQUET CUFF WITH PROTECTIVE SLEEVE AND PLC, DUAL PORT, SINGLE BLADDER, 18 IN. (46 CM)

## (undated) DEVICE — DRAPE,EXTREMITY,89X128,STERILE: Brand: MEDLINE

## (undated) DEVICE — SINGLE-USE POLYPECTOMY SNARE: Brand: CAPTIVATOR

## (undated) DEVICE — SOLUTION IV 1000ML 0.9% SOD CHL

## (undated) DEVICE — STRETCH BANDAGE ROLL: Brand: DERMACEA

## (undated) DEVICE — Device

## (undated) DEVICE — NEEDLE HYPO 25GA L1.5IN BVL ORIENTED ECLIPSE

## (undated) DEVICE — INFECTION CONTROL KIT SYS